# Patient Record
Sex: MALE | Race: WHITE
[De-identification: names, ages, dates, MRNs, and addresses within clinical notes are randomized per-mention and may not be internally consistent; named-entity substitution may affect disease eponyms.]

---

## 2018-03-29 ENCOUNTER — HOSPITAL ENCOUNTER (INPATIENT)
Dept: HOSPITAL 14 - H.ER | Age: 53
LOS: 4 days | Discharge: NURSING, REHAB OR LONG-TERM CARE | DRG: 202 | End: 2018-04-02
Attending: INTERNAL MEDICINE | Admitting: INTERNAL MEDICINE
Payer: MEDICARE

## 2018-03-29 DIAGNOSIS — E78.00: ICD-10-CM

## 2018-03-29 DIAGNOSIS — E11.65: ICD-10-CM

## 2018-03-29 DIAGNOSIS — E11.42: ICD-10-CM

## 2018-03-29 DIAGNOSIS — N18.9: ICD-10-CM

## 2018-03-29 DIAGNOSIS — T17.998A: ICD-10-CM

## 2018-03-29 DIAGNOSIS — J06.9: ICD-10-CM

## 2018-03-29 DIAGNOSIS — N39.0: ICD-10-CM

## 2018-03-29 DIAGNOSIS — I25.10: ICD-10-CM

## 2018-03-29 DIAGNOSIS — E78.5: ICD-10-CM

## 2018-03-29 DIAGNOSIS — J45.21: Primary | ICD-10-CM

## 2018-03-29 DIAGNOSIS — Z94.0: ICD-10-CM

## 2018-03-29 DIAGNOSIS — I48.0: ICD-10-CM

## 2018-03-29 DIAGNOSIS — Z95.1: ICD-10-CM

## 2018-03-29 DIAGNOSIS — I50.9: ICD-10-CM

## 2018-03-29 DIAGNOSIS — F41.9: ICD-10-CM

## 2018-03-29 DIAGNOSIS — Z79.01: ICD-10-CM

## 2018-03-29 DIAGNOSIS — Z95.5: ICD-10-CM

## 2018-03-29 DIAGNOSIS — I13.0: ICD-10-CM

## 2018-03-29 DIAGNOSIS — E11.22: ICD-10-CM

## 2018-03-29 LAB
ALBUMIN SERPL-MCNC: 3.8 G/DL (ref 3.5–5)
ALBUMIN/GLOB SERPL: 1.3 {RATIO} (ref 1–2.1)
ALT SERPL-CCNC: 31 U/L (ref 21–72)
ANISOCYTOSIS BLD QL SMEAR: SLIGHT
AST SERPL-CCNC: 29 U/L (ref 17–59)
BACTERIA #/AREA URNS HPF: (no result) /[HPF]
BASOPHILS # BLD AUTO: 0 K/UL (ref 0–0.2)
BASOPHILS NFR BLD: 0.7 % (ref 0–2)
BASOPHILS NFR BLD: 1 % (ref 0–2)
BILIRUB UR-MCNC: NEGATIVE MG/DL
BUN SERPL-MCNC: 24 MG/DL (ref 9–20)
CALCIUM SERPL-MCNC: 10.4 MG/DL (ref 8.4–10.2)
COLOR UR: YELLOW
EOSINOPHIL # BLD AUTO: 0.1 K/UL (ref 0–0.7)
EOSINOPHIL NFR BLD: 0.8 % (ref 0–4)
EOSINOPHIL NFR BLD: 2 % (ref 0–7)
ERYTHROCYTE [DISTWIDTH] IN BLOOD BY AUTOMATED COUNT: 16.2 % (ref 11.5–14.5)
GFR NON-AFRICAN AMERICAN: > 60
GLUCOSE UR STRIP-MCNC: >=500 MG/DL
GRAN CASTS #/AREA URNS LPF: 1 /LPF (ref 0–1)
HGB BLD-MCNC: 14.5 G/DL (ref 12–18)
LEUKOCYTE ESTERASE UR-ACNC: (no result) LEU/UL
LYMPHOCYTE: 3 % (ref 20–50)
LYMPHOCYTES # BLD AUTO: 0.1 K/UL (ref 1–4.3)
LYMPHOCYTES NFR BLD AUTO: 2.2 % (ref 20–40)
MCH RBC QN AUTO: 28.2 PG (ref 27–31)
MCHC RBC AUTO-ENTMCNC: 32.6 G/DL (ref 33–37)
MCV RBC AUTO: 86.4 FL (ref 80–94)
MONOCYTE: 8 % (ref 0–10)
MONOCYTES # BLD: 0.6 K/UL (ref 0–0.8)
MONOCYTES NFR BLD: 8.6 % (ref 0–10)
NEUTROPHILS # BLD: 5.7 K/UL (ref 1.8–7)
NEUTROPHILS NFR BLD AUTO: 86 % (ref 42–75)
NEUTROPHILS NFR BLD AUTO: 87.7 % (ref 50–75)
NRBC BLD AUTO-RTO: 0.1 % (ref 0–0)
PH UR STRIP: 6 [PH] (ref 5–8)
PLATELET # BLD EST: NORMAL 10*3/UL
PLATELET # BLD: 159 K/UL (ref 130–400)
PMV BLD AUTO: 8.8 FL (ref 7.2–11.7)
PROT UR STRIP-MCNC: 30 MG/DL
RBC # BLD AUTO: 5.14 MIL/UL (ref 4.4–5.9)
RBC # UR STRIP: NEGATIVE /UL
SP GR UR STRIP: 1.02 (ref 1–1.03)
SQUAMOUS EPITHIAL: 2 /HPF (ref 0–5)
TOTAL CELLS COUNTED BLD: 100
TROPONIN I SERPL-MCNC: 0.03 NG/ML (ref 0–0.12)
URINE AMORPHOUS SEDIMENT: (no result) /UL
URINE CLARITY: (no result)
UROBILINOGEN UR-MCNC: (no result) MG/DL (ref 0.2–1)
WBC # BLD AUTO: 6.5 K/UL (ref 4.8–10.8)

## 2018-03-29 NOTE — RAD
HISTORY:



COMPARISON:

05/16/2016.



TECHNIQUE:

Chest PA and lateral



FINDINGS:



LINES AND TUBES:

None. 



LUNG AND PLEURA:

There is mild pulmonary venous congestion. No focal consolidation. 

There is left pleural thickening.  No right pleural effusion. 



HEART AND MEDIASTINUM:

There is mild cardiomegaly. Status post CABG. The hilar and 

mediastinal contours are within normal limits.



SKELETAL STRUCTURES:

There are old fracture deformities in the left lower lateral ribs. 



VISUALIZED UPPER ABDOMEN:

Normal.



OTHER FINDINGS:

None.



IMPRESSION:

No active pulmonary disease.

## 2018-03-29 NOTE — ED PDOC
HPI: General Adult


Time Seen by Provider: 03/29/18 12:41


Chief Complaint (Nursing): ENT Problem


Chief Complaint (Provider): Cough


History Per: Patient


History/Exam Limitations: no limitations


Onset/Duration Of Symptoms: Days (x7)


Current Symptoms Are (Timing): Still Present


Additional Complaint(s): 


53 year old male presents to the emergency department complaining of a cough 

for 7 days. Patient saw his primary, Dr. Sanchez, on Monday and was prescribed 

Augmentin and promethazine cough medicine but he states he doesn't feel any 

better. He denies any fever or chills. 





PMD: Dr. Sanchez 





Past Medical History


Reviewed: Historical Data, Nursing Documentation, Vital Signs


Vital Signs: 


 Last Vital Signs











Temp  98.0 F   03/29/18 12:35


 


Pulse  79   03/29/18 12:35


 


Resp  16   03/29/18 12:35


 


BP  162/75 H  03/29/18 12:35


 


Pulse Ox  92 L  03/29/18 14:49














- Medical History


PMH: Anxiety, Asthma, Atrial Fibrillation, CAD, CHF, Diabetes, Fractures, HTN, 

Hypercholesterolemia, Hyperlipidemia, Chronic Kidney Disease





- Surgical History


Surgical History: CABG, Carotid Endarterectomy, Cholecystectomy, Coronary Stent


Other surgeries: Kidney transplant





- Family History


Family History: States: No Known Family Hx





- Living Arrangements


Living Arrangements: With Family





- Social History


Current smoker - smoking cessation education provided: No


Alcohol: None


Drugs: Denies





- Home Medications


Home Medications: 


 Ambulatory Orders











 Medication  Instructions  Recorded


 


Insulin Aspart/Insulin Aspar 16 - 18 unit SC DIN 01/13/16





[Novolog Mix 70/30 (70/30  





units/ml)]  


 


Insulin Aspart/Insulin Aspar 25 unit SC BRK 01/13/16





[Novolog Mix 70/30 (70/30  





units/ml)]  


 


Pantoprazole Sodium [Protonix] 40 mg PO ACL 01/13/16


 


Pregabalin [Lyrica] 100 mg PO HS 05/16/16


 


traMADol [Ultram] 50 mg PO DAILY PRN 05/16/16


 


Albuterol 0.083% [Albuterol 0.083% 3 ml IH Q6 PRN 03/29/18





Inhal Sol (2.5 mg/3 ml) UD]  


 


Alprazolam [Xanax] 0.5 mg PO HS PRN 03/29/18


 


Amoxicillin/Clavulanate [Augmentin 1 tab PO BID 03/29/18





875 MG-125 MG Tab]  


 


Aspirin [Ecotrin] 81 mg PO DAILY 03/29/18


 


Atorvastatin [Lipitor] 20 mg PO HS 03/29/18


 


Cholecalciferol (Vitamin D3) 2,000 unit PO DAILY 03/29/18





[Vitamin D3]  


 


Diltiazem HCl [Diltiazem 24Hr ER] 240 mg PO DAILY 03/29/18


 


Metoprolol Tartrate [Lopressor] 100 mg PO Q12 03/29/18


 


Multivitamin [Multi-Vitamin Daily] 1 tab PO DAILY 03/29/18


 


Mycophenolate Sodium [Mycophenolic 540 mg PO Q12 03/29/18





Acid]  


 


Promethazine [Phenergan Syrup] 10 ml PO Q6 PRN 03/29/18


 


Sulfamethoxazole/Trimethoprim 1 tab PO MWF 03/29/18





[Bactrim DS Tab]  


 


Tacrolimus [Astagraf Xl] 2 mg PO DAILY@0700 03/29/18


 


Warfarin [Coumadin] 5 mg PO SUMOTUTHSA 03/29/18


 


Warfarin [Coumadin] 7.5 mg PO WE 03/29/18


 


predniSONE [predniSONE Tab] 5 mg PO DAILY 03/29/18














- Allergies


Allergies/Adverse Reactions: 


 Allergies











Allergy/AdvReac Type Severity Reaction Status Date / Time


 


No Known Allergies Allergy   Verified 03/29/18 12:35














Review of Systems


ROS Statement: Except As Marked, All Systems Reviewed And Found Negative


Constitutional: Negative for: Fever, Chills


Respiratory: Positive for: Cough, Shortness of Breath, Wheezing.  Negative for: 

Hemoptysis, Sputum


Gastrointestinal: Negative for: Nausea, Vomiting


Neurological: Negative for: Headache, Dizziness





Physical Exam





- Reviewed


Nursing Documentation Reviewed: Yes


Vital Signs Reviewed: Yes





- Physical Exam


Appears: Positive for: Well, Non-toxic, No Acute Distress


Head Exam: Positive for: ATRAUMATIC, NORMAL INSPECTION, NORMOCEPHALIC


Skin: Positive for: Normal Color.  Negative for: Rash


Eye Exam: Positive for: Other (chronic vision loss left eye)


Cardiovascular/Chest: Positive for: Regular Rate, Rhythm


Respiratory: Positive for: Rhonchi, Wheezing (bilateral inspiratory and 

expiratory wheezing).  Negative for: Accessory Muscle Use, Respiratory Distress


Gastrointestinal/Abdominal: Positive for: Soft.  Negative for: Tenderness


Back: Negative for: L CVA Tenderness, R CVA Tenderness


Extremity: Positive for: Normal ROM


Neurologic/Psych: Positive for: Alert, Oriented





- Laboratory Results


Result Diagrams: 


 03/29/18 14:15





 03/29/18 14:15





- ECG


Interpretation Of ECG: 


Normal sinus rhythm 78 bpm, left axis deviation, left ventricular hypertrophy, 

reviewed by PA and ED attending


O2 Sat by Pulse Oximetry: 92 (RA)


Pulse Ox Interpretation: Abnormal





- Other Rad


  ** CXR


X-Ray: Interpreted by Me, Viewed By Me


X-Ray Interpretation: no acute finding





Nebulizer Treatments/Peak Flow





- Duonebs


Number of Bronchodilator Doses given?: 1 (duoneb)





- Pre/Post Peak Flow


Pre Treatment Peak Flow: 110


Post treatment Peak Flow: 190





- Steroid Treatment


Steroid: IV (125 solumedrol)





- Clinical Response


Clinical Response: Unchanged





Medical Decision Making


Medical Decision Making: 


Impression: 53 year old with URI symptoms





Time: 12:48





Initial Plan:


--Chest X-Ray


--Duoneb 3 ml INH


--Peak Flow pre/post treatment


--Flu swab 


--Urinalysis 


--Urine culture


--EKG


--CBC


--CMP


--Troponin I


--PO Robitussin with codeine 10 ml





14:45  Case discussed with patient's primary doctor, Dr. Sanchez, who recommends 

admission.  Patient has been taking oral Augmentin and cough meds for 4 days 

and reports no improvement to URI symptoms.  Patient's oxygen saturation is 

between 90 and 92% in the ED despite administration of IV steroids and DuoNeb 

treatments. Leukocytes also noted in the urine consistent with UTI.  Patient 

agrees with admission.  On gram IV Rocephin ordered along with 500 mg IV 

Zithromax.





--------------------------------------------------------------------------------

-----------------


Scribe Attestation:   


Documented by Breanna Weaver, acting as a scribe for Liz Oh PA-C





Provider Scribe Attestation:


All medical record entries made by the Scribe were at my direction and 

personally dictated by me. I have reviewed the chart and agree that the record 

accurately reflects my personal performance of the history, physical exam, 

medical decision making, and the department course for this patient. I have 

also personally directed, reviewed, and agree with the discharge instructions 

and disposition.





Disposition





- Clinical Impression


Clinical Impression: 


 Dyspnea and respiratory abnormalities, Urinary tract infection








- Patient ED Disposition


Is Patient to be Admitted: Yes





- Disposition


Disposition Time: 15:46


Condition: FAIR


Forms:  [facility] (English)





- Pt Status Changed To:


Hospital Disposition Of: Inpatient





- Admit Certification


Admit to Inpatient:: After my assessment, the patient will require 

hospitalization for at least two midnights.  This is because of the severity of 

symptoms shown, intensity of services needed, and/or the medical risk in this 

patient being treated as an outpatient.





- POA


Present On Arrival: None





Results





- Lab Results


Lab Results: 

















  03/29/18 03/29/18 03/29/18





  14:15 14:15 13:54


 


WBC   6.5 


 


RBC   5.14 


 


Hgb   14.5 


 


Hct   44.4 


 


MCV   86.4  D 


 


MCH   28.2 


 


MCHC   32.6 L 


 


RDW   16.2 H 


 


Plt Count   159 


 


MPV   8.8 


 


Neut % (Auto)   87.7 H 


 


Lymph % (Auto)   2.2 L 


 


Mono % (Auto)   8.6 


 


Eos % (Auto)   0.8 


 


Baso % (Auto)   0.7 


 


Neut # (Auto)   5.7 


 


Lymph # (Auto)   0.1 L 


 


Mono # (Auto)   0.6 


 


Eos # (Auto)   0.1 


 


Baso # (Auto)   0.0 


 


Neutrophils % (Manual)   Pending 


 


Lymphocytes % (Manual)   Pending 


 


Monocytes % (Manual)   Pending 


 


Platelet Estimate   Pending 


 


Sodium  139  


 


Potassium  4.8  


 


Chloride  100  


 


Carbon Dioxide  28  


 


Anion Gap  16  


 


BUN  24 H  


 


Creatinine  1.1  


 


Est GFR ( Amer)  > 60  


 


Est GFR (Non-Af Amer)  > 60  


 


POC Glucose (mg/dL)   


 


Random Glucose  229 H  


 


Calcium  10.4 H  


 


Total Bilirubin  0.6  


 


AST  29  


 


ALT  31  


 


Alkaline Phosphatase  99  


 


Troponin I  0.0340  


 


Total Protein  6.6  


 


Albumin  3.8  


 


Globulin  2.8  


 


Albumin/Globulin Ratio  1.3  


 


Urine Color    Yellow


 


Urine Clarity    Slighty-cloudy


 


Urine pH    6.0


 


Ur Specific Gravity    1.022


 


Urine Protein    30


 


Urine Glucose (UA)    >=500


 


Urine Ketones    Negative


 


Urine Blood    Negative


 


Urine Nitrate    Negative


 


Urine Bilirubin    Negative


 


Urine Urobilinogen    0.2-1.0


 


Ur Leukocyte Esterase    Small


 


Urine RBC (Auto)    2


 


Urine Microscopic WBC    9 H


 


Ur Squamous Epith Cells    2


 


Amorphous Sediment    Few H


 


Urine Bacteria    Few H


 


Granular Casts (Auto)    1


 


Influenza Typ A,B (EIA)   














  03/29/18 03/29/18





  13:54 13:31


 


WBC  


 


RBC  


 


Hgb  


 


Hct  


 


MCV  


 


MCH  


 


MCHC  


 


RDW  


 


Plt Count  


 


MPV  


 


Neut % (Auto)  


 


Lymph % (Auto)  


 


Mono % (Auto)  


 


Eos % (Auto)  


 


Baso % (Auto)  


 


Neut # (Auto)  


 


Lymph # (Auto)  


 


Mono # (Auto)  


 


Eos # (Auto)  


 


Baso # (Auto)  


 


Neutrophils % (Manual)  


 


Lymphocytes % (Manual)  


 


Monocytes % (Manual)  


 


Platelet Estimate  


 


Sodium  


 


Potassium  


 


Chloride  


 


Carbon Dioxide  


 


Anion Gap  


 


BUN  


 


Creatinine  


 


Est GFR ( Amer)  


 


Est GFR (Non-Af Amer)  


 


POC Glucose (mg/dL)   234 H


 


Random Glucose  


 


Calcium  


 


Total Bilirubin  


 


AST  


 


ALT  


 


Alkaline Phosphatase  


 


Troponin I  


 


Total Protein  


 


Albumin  


 


Globulin  


 


Albumin/Globulin Ratio  


 


Urine Color  


 


Urine Clarity  


 


Urine pH  


 


Ur Specific Gravity  


 


Urine Protein  


 


Urine Glucose (UA)  


 


Urine Ketones  


 


Urine Blood  


 


Urine Nitrate  


 


Urine Bilirubin  


 


Urine Urobilinogen  


 


Ur Leukocyte Esterase  


 


Urine RBC (Auto)  


 


Urine Microscopic WBC  


 


Ur Squamous Epith Cells  


 


Amorphous Sediment  


 


Urine Bacteria  


 


Granular Casts (Auto)  


 


Influenza Typ A,B (EIA)  Negative for flu a/b

## 2018-03-29 NOTE — CARD
--------------- APPROVED REPORT --------------





EKG Measurement

Heart [id number]

VT [id number]

[id number]

[id number]

[id number]



<Conclusion>

Normal sinus rhythm

Possible Left atrial enlargement

Left axis deviation

Left ventricular hypertrophy with repolarization abnormality

Abnormal ECG

## 2018-03-30 LAB
BUN SERPL-MCNC: 25 MG/DL (ref 9–20)
CALCIUM SERPL-MCNC: 9.9 MG/DL (ref 8.4–10.2)
ERYTHROCYTE [DISTWIDTH] IN BLOOD BY AUTOMATED COUNT: 16.1 % (ref 11.5–14.5)
GFR NON-AFRICAN AMERICAN: > 60
HDLC SERPL-MCNC: 36 MG/DL (ref 30–70)
HGB BLD-MCNC: 14.3 G/DL (ref 12–18)
INR PPP: 1.8 (ref 0.9–1.2)
LDLC SERPL-MCNC: 114 MG/DL (ref 0–129)
MCH RBC QN AUTO: 28.3 PG (ref 27–31)
MCHC RBC AUTO-ENTMCNC: 32.5 G/DL (ref 33–37)
MCV RBC AUTO: 87.2 FL (ref 80–94)
PLATELET # BLD: 163 K/UL (ref 130–400)
PROTHROMBIN TIME: 19.7 SECONDS (ref 9.8–13.1)
RBC # BLD AUTO: 5.05 MIL/UL (ref 4.4–5.9)
WBC # BLD AUTO: 6.8 K/UL (ref 4.8–10.8)

## 2018-03-30 RX ADMIN — IPRATROPIUM BROMIDE AND ALBUTEROL SULFATE SCH ML: .5; 3 SOLUTION RESPIRATORY (INHALATION) at 14:26

## 2018-03-30 RX ADMIN — IPRATROPIUM BROMIDE AND ALBUTEROL SULFATE SCH ML: .5; 3 SOLUTION RESPIRATORY (INHALATION) at 19:19

## 2018-03-30 RX ADMIN — PROMETHAZINE HYDROCHLORIDE PRN MG: 6.25 SYRUP ORAL at 14:11

## 2018-03-30 RX ADMIN — PROMETHAZINE HYDROCHLORIDE PRN MG: 6.25 SYRUP ORAL at 20:58

## 2018-03-30 RX ADMIN — SULFAMETHOXAZOLE AND TRIMETHOPRIM SCH TAB: 800; 160 TABLET ORAL at 08:41

## 2018-03-30 RX ADMIN — DILTIAZEM HYDROCHLORIDE SCH MG: 240 CAPSULE, COATED, EXTENDED RELEASE ORAL at 08:42

## 2018-03-30 RX ADMIN — PANTOPRAZOLE SODIUM SCH MG: 40 TABLET, DELAYED RELEASE ORAL at 12:54

## 2018-03-30 RX ADMIN — Medication SCH TAB: at 08:52

## 2018-03-30 RX ADMIN — INSULIN LISPRO SCH UNITS: 100 INJECTION, SOLUTION INTRAVENOUS; SUBCUTANEOUS at 14:07

## 2018-03-30 RX ADMIN — INSULIN LISPRO SCH UNITS: 100 INJECTION, SOLUTION INTRAVENOUS; SUBCUTANEOUS at 17:27

## 2018-03-30 RX ADMIN — IPRATROPIUM BROMIDE AND ALBUTEROL SULFATE SCH ML: .5; 3 SOLUTION RESPIRATORY (INHALATION) at 02:41

## 2018-03-30 RX ADMIN — INSULIN LISPRO SCH UNITS: 100 INJECTION, SUSPENSION SUBCUTANEOUS at 08:46

## 2018-03-30 RX ADMIN — VITAMIN D, TAB 1000IU (100/BT) SCH INTLU: 25 TAB at 08:53

## 2018-03-30 RX ADMIN — PROMETHAZINE HYDROCHLORIDE PRN MG: 6.25 SYRUP ORAL at 02:38

## 2018-03-30 RX ADMIN — IPRATROPIUM BROMIDE AND ALBUTEROL SULFATE SCH ML: .5; 3 SOLUTION RESPIRATORY (INHALATION) at 07:57

## 2018-03-30 NOTE — PQF GENQUE
Dr. Sanchez,



3 queries: 

CHF and CKD and PAF: listed as hx.: Are any of these dxs. currently being 
treated?

-------Current Paroxysmal A-Fib 

-------If CHF is a current condition: please include the acuity and type if 
known

------  If CKD ruled in : Stage? 



versus: history and not current conditions





BUN:24->25

Creatinine: 1.1->1.0

Est GRF: >60/>60





ER: PE: Respiratory: Positive for: Rhonchi, Wheezing (bilateral inspiratory and 
expiratory wheezing). 

Negative for: Accessory Muscle Use, Respiratory Distress 

O2 Sat by Pulse Oximetry: 92 (RA)

 Duonebs  Number of Bronchodilator Doses given?: 1 (duoneb)  

- Pre/Post Peak Flow  Pre Treatment Peak Flow: 110  Post treatment Peak Flow: 
190  

- Steroid Treatment  Steroid: IV (125 solumedrol)  

Patient's oxygen saturation is between 90 and 92% in the ED despite 
administration of IV steroids and DuoNeb treatments

Clinical Impression: Dyspnea and respiratory abnormalities, Urinary tract 
infection  





H and P: H and P: PE: LUNG: Poor aeration bilaterally with audible wheezing and 
rales

EXTREMITIES: Show 1+ pitting pedal edema.

Chest 

x-ray mild bilateral pulmonary congestion, otherwise, unremarkable. 

IMPRESSION:   Shortness of breath, cough probably secondary to acute asthma 
exacerbation, upper respiratory tract infection, history of coronary artery 
bypass graft, history of diabetes mellitus with hyperglycemia (type 2 diabetes)
, history of renal transplantation, history of peripheral neuropathy, and 
hyperlipidemia. 







meds include: Coumadin daily, ASA, Diltiazem







***** This form is a permanent part of the medical record*****





          

Clarification of your documentation is requested to better reflect the severity 
of illness and intensity of treatment of your patient.  



Indicators present      



[]  Specify: []

         



[]  Specify: []         

 



[]  Specify: []         





[]  Specify: []         





Location in the medical record that reflects the above clinical findings:  []

 



Treatment Provided:  []

  

PHYSICIAN'S RESPONSE





Based on your medical judgment of the clinical indicators outlined above please 
clarify the following:



[x]  Practitioner response --paroxsmal a.fib--now in sinus rhythm--being 
treated with coumadin

chf-stable



[]  If unable to determine, please check the box, sign and date.  





Present On Admission (POA) Indicator:





[] Present at the time of admission 



[] Not present at the time of admission



[] Clinically Undetermined









In responding to this query, please exercise your independent professional 
judgment.  The fact that a question is asked does not imply that any particular 
answer is desired or expected.  Thank you for your clarification on this 
documentation.



If you have any questions please call.

* Thank you,

   Darcy Ortiz RN

   ext. #[phone] 
MTDD

## 2018-03-30 NOTE — PQF GENQUE
Dr. Sanchez,



Please clarify type of asthma exacerbation : if known: i.e.

     Mild intermittent 

     Mild persistent 

     Moderate persistent 

     Severe persistent 

      Other (please specify)_____________________ 

     Clinically unable to determine 

     Unknown 



Albuterol INH  stat  x 2 and Q6 hrs., Solumedrol IV  







 

***** This form is a permanent part of the medical record*****





          

Clarification of your documentation is requested to better reflect the severity 
of illness and intensity of treatment of your patient.  



Indicators present      



[]  Specify: []

         



[]  Specify: []         

 



[]  Specify: []         





[]  Specify: []         





Location in the medical record that reflects the above clinical findings:  []

 



Treatment Provided:  []

  

PHYSICIAN'S RESPONSE





Based on your medical judgment of the clinical indicators outlined above please 
clarify the following:



[x]  Practitioner response --acute exacerbation of mild intermittent asthma



[]  If unable to determine, please check the box, sign and date.  





Present On Admission (POA) Indicator:





[] Present at the time of admission 



[] Not present at the time of admission



[] Clinically Undetermined









In responding to this query, please exercise your independent professional 
judgment.  The fact that a question is asked does not imply that any particular 
answer is desired or expected.  Thank you for your clarification on this 
documentation.



If you have any questions please call.

* Thank you,

   Darcy Ortiz RN

   ext. #[phone] 
MTDD

## 2018-03-30 NOTE — HP
HISTORY OF PRESENT ILLNESS:  Mr. Mena is a 53-year-old male who was

admitted via the Emergency Room because of progressively worsening

shortness of breath, exercise intolerance, and cough productive of thick

tenacious mucus for the past 1 week prior to presentation.  He was seen in

the office about 2 weeks prior to this presentation, treated with oral

antibiotics and symptoms improved but then he got sick again after he was

exposed to his nephew at home who had symptoms of upper respiratory tract

infection and was recently diagnosed to have a strep throat.  He showed up

in the office and was placed on Augmentin, but symptoms, however, worsened.

He denies fever or chills, but admits to cough, chest tightness, exercise

intolerance, and sore throat.



PAST MEDICAL HISTORY:  Coronary artery bypass graft, recent renal

transplantation, paroxysmal atrial fibrillation, asthma, coronary artery

disease, congestive heart failure, diabetes mellitus, hypertension,

hyperlipidemia, and anxiety disorder with peripheral neuropathy.



FAMILY HISTORY:  Noncontributory.



SOCIAL HISTORY:  He does not smoke or drink and lives at home with wife.



REVIEW OF SYSTEMS:  Remarkable for unsteadiness of gait, exercise

intolerance, and pain in the lower extremities.



PHYSICAL EXAMINATION:

GENERAL:  The patient is alert and oriented, still uncomfortable and

appears chronically ill.

VITAL SIGNS:  Blood pressure of 162/75, pulse of 79, respiratory rate of

16-20 per minute.  He is febrile with temperature of 98 degrees Fahrenheit,

O2 sat 92% on room air.

SKIN:  Shows fair turgor.

HEENT:  Pupils are equal and reactive to light and accommodation.  JVP

flat.  Mouth shows fair hygiene with mucous engorgement of pharynx.

LUNG:  Poor aeration bilaterally with audible wheezing and rales.

HEART:  Regular.  No murmurs or gallops appreciated.  There is a scar of

coronary artery bypass graft over the anterior chest wall area.

ABDOMEN:  Soft and nontender, no organomegaly.

EXTREMITIES:  Show 1+ pitting pedal edema.

CENTRAL NERVOUS SYSTEM:  Exam grossly intact except for unsteadiness of

gait.  The patient is alert and oriented x3.



LABORATORY DATA:  WBC 6.8, hemoglobin 14.3, and platelet count of 163,000. 

Sodium of 135, potassium of 5.1, BUN of 25, creatinine of 1.0, and serum

glucose of 361.  ProBNP of 1310.  Cholesterol of 165 and .  Chest

x-ray mild bilateral pulmonary congestion, otherwise, unremarkable. 

Hardware of coronary artery bypass graft noted.  EKG is remarkable for

normal sinus rhythm, possible left atrial enlargement, left ventricular

_____, and left ventricular hypertrophy by voltage.











IMPRESSION:  Shortness of breath, cough probably secondary to acute asthma

exacerbation, upper respiratory tract infection, history of coronary artery

bypass graft, history of diabetes mellitus with hyperglycemia (type 2

diabetes), history of renal transplantation, history of peripheral

neuropathy, and hyperlipidemia.



PLAN:  The plan is intravenous steroids as well as bronchodilators,

intravenous antibiotics, and oxygen.  We would obtain sputum for Gram stain

and cultures.  Septic workup already done in the Emergency Room.  Further

therapy will depend on findings.  We will discharge home once clinically

stable.





__________________________________________

Bismark Sanchez MD





DD:  03/30/2018 10:29:00

DT:  03/30/2018 10:31:49

Job # [id number]

## 2018-03-30 NOTE — PQF GENQUE
Dr. Sanchez,



2 queries: 

 1.Respiratory Failure ruled in or ruled out?: this dx. is listed in the 
Admission order and then the diagnosis is dropped 

2. If ruled in Acuity and Type?



OR; Unable to determine





ER: PE: Respiratory: Positive for: Rhonchi, Wheezing (bilateral inspiratory and 
expiratory wheezing). 

Negative for: Accessory Muscle Use, Respiratory Distress 

O2 Sat by Pulse Oximetry: 92 (RA)

 Duonebs  

Number of Bronchodilator Doses given?: 1 (duoneb)  

- Pre/Post Peak Flow  Pre Treatment Peak Flow: 110  Post treatment Peak Flow: 
190  

- Steroid Treatment  Steroid: IV (125 solumedrol)  

Patient's oxygen saturation is between 90 and 92% in the ED despite 
administration of IV 

steroids and DuoNeb treatments

Clinical Impression:   Dyspnea and respiratory abnormalities, Urinary tract 
infection  



H and P: PE: LUNG: Poor aeration bilaterally with audible wheezing and rales. 

 Impression: SOB, Cough probably secondary to acute Asthma Exacerbation, URI







***** This form is a permanent part of the medical record*****





          

Clarification of your documentation is requested to better reflect the severity 
of illness and intensity of treatment of your patient.  



Indicators present      



[]  Specify: []

         



[]  Specify: []         

 



[]  Specify: []         





[]  Specify: []         





Location in the medical record that reflects the above clinical findings:  []

 



Treatment Provided:  []

  

PHYSICIAN'S RESPONSE





Based on your medical judgment of the clinical indicators outlined above please 
clarify the following:



[x]  Practitioner response --respiratory failure ruled out



[]  If unable to determine, please check the box, sign and date.  





Present On Admission (POA) Indicator:





[] Present at the time of admission 



[] Not present at the time of admission



[] Clinically Undetermined









In responding to this query, please exercise your independent professional 
judgment.  The fact that a question is asked does not imply that any particular 
answer is desired or expected.  Thank you for your clarification on this 
documentation.



If you have any questions please call.

* Thank you,

   Darcy Ortiz RN

   ext. #[phone] 
MTDD

## 2018-03-31 LAB
BUN SERPL-MCNC: 34 MG/DL (ref 9–20)
CALCIUM SERPL-MCNC: 10.1 MG/DL (ref 8.4–10.2)
GFR NON-AFRICAN AMERICAN: > 60
INR PPP: 2.6 (ref 0.9–1.2)
PROTHROMBIN TIME: 28.9 SECONDS (ref 9.8–13.1)

## 2018-03-31 RX ADMIN — IPRATROPIUM BROMIDE AND ALBUTEROL SULFATE SCH ML: .5; 3 SOLUTION RESPIRATORY (INHALATION) at 07:55

## 2018-03-31 RX ADMIN — INSULIN LISPRO SCH UNITS: 100 INJECTION, SUSPENSION SUBCUTANEOUS at 08:00

## 2018-03-31 RX ADMIN — VITAMIN D, TAB 1000IU (100/BT) SCH INTLU: 25 TAB at 09:24

## 2018-03-31 RX ADMIN — PANTOPRAZOLE SODIUM SCH MG: 40 TABLET, DELAYED RELEASE ORAL at 12:00

## 2018-03-31 RX ADMIN — ACETYLCYSTEINE SCH ML: 200 INHALANT RESPIRATORY (INHALATION) at 20:02

## 2018-03-31 RX ADMIN — Medication SCH TAB: at 09:21

## 2018-03-31 RX ADMIN — INSULIN LISPRO SCH UNITS: 100 INJECTION, SOLUTION INTRAVENOUS; SUBCUTANEOUS at 08:00

## 2018-03-31 RX ADMIN — IPRATROPIUM BROMIDE AND ALBUTEROL SULFATE SCH ML: .5; 3 SOLUTION RESPIRATORY (INHALATION) at 20:02

## 2018-03-31 RX ADMIN — INSULIN LISPRO SCH UNITS: 100 INJECTION, SUSPENSION SUBCUTANEOUS at 18:00

## 2018-03-31 RX ADMIN — PROMETHAZINE HYDROCHLORIDE PRN MG: 6.25 SYRUP ORAL at 03:47

## 2018-03-31 RX ADMIN — INSULIN LISPRO SCH UNITS: 100 INJECTION, SOLUTION INTRAVENOUS; SUBCUTANEOUS at 17:00

## 2018-03-31 RX ADMIN — PROMETHAZINE HYDROCHLORIDE AND CODEINE PHOSPHATE PRN ML: 6.25; 1 SOLUTION ORAL at 18:12

## 2018-03-31 RX ADMIN — INSULIN LISPRO SCH UNITS: 100 INJECTION, SOLUTION INTRAVENOUS; SUBCUTANEOUS at 11:16

## 2018-03-31 RX ADMIN — METHYLPREDNISOLONE SODIUM SUCCINATE SCH MG: 40 INJECTION, POWDER, FOR SOLUTION INTRAMUSCULAR; INTRAVENOUS at 21:12

## 2018-03-31 RX ADMIN — DILTIAZEM HYDROCHLORIDE SCH MG: 240 CAPSULE, COATED, EXTENDED RELEASE ORAL at 09:23

## 2018-03-31 RX ADMIN — INSULIN LISPRO SCH: 100 INJECTION, SOLUTION INTRAVENOUS; SUBCUTANEOUS at 22:15

## 2018-03-31 RX ADMIN — IPRATROPIUM BROMIDE AND ALBUTEROL SULFATE SCH ML: .5; 3 SOLUTION RESPIRATORY (INHALATION) at 14:15

## 2018-03-31 RX ADMIN — IPRATROPIUM BROMIDE AND ALBUTEROL SULFATE SCH ML: .5; 3 SOLUTION RESPIRATORY (INHALATION) at 01:00

## 2018-03-31 RX ADMIN — METHYLPREDNISOLONE SODIUM SUCCINATE SCH: 40 INJECTION, POWDER, FOR SOLUTION INTRAMUSCULAR; INTRAVENOUS at 12:00

## 2018-03-31 NOTE — CP.PCM.PN
Subjective





- Date & Time of Evaluation


Date of Evaluation: 03/31/18


Time of Evaluation: 11:47





- Subjective


Subjective: 





MORE SHORT OF BREATH WITH COUGHING SPELLS TODAY








Objective





- Vital Signs/Intake and Output


Vital Signs (last 24 hours): 


 











Temp Pulse Resp BP Pulse Ox


 


 97.4 F L  65   20   135/71   93 L


 


 03/31/18 08:35  03/31/18 09:23  03/31/18 08:35  03/31/18 09:23  03/31/18 08:35











- Medications


Medications: 


 Current Medications





Acetaminophen (Tylenol 325mg Tab)  650 mg PO Q6 PRN


   PRN Reason:  temp 101


Acetylcysteine (Acetylcysteine 20%)  2 ml INH RBID [facility]


Albuterol/Ipratropium (Duoneb 3 Mg/0.5 Mg (3 Ml) Ud)  3 ml INH RQ6 [facility]


   Last Admin: 03/31/18 07:55 Dose:  3 ml


Alprazolam (Xanax)  0.5 mg PO HS PRN


   PRN Reason: Anxiety


Aspirin (Ecotrin)  81 mg PO DAILY [facility]


   Last Admin: 03/31/18 09:25 Dose:  81 mg


Atorvastatin Calcium (Lipitor)  20 mg PO HS [facility]


   Last Admin: 03/30/18 21:01 Dose:  20 mg


Cholecalciferol (Vitamin D)  2,000 intlu PO DAILY [facility]


   Last Admin: 03/31/18 09:24 Dose:  2,000 intlu


Diltiazem HCl (Cardizem Cd)  240 mg PO DAILY [facility]


   Last Admin: 03/31/18 09:23 Dose:  240 mg


Home Med (Mycophenolate Sodium [Mycophenolic Acid])  540 mg PO Q12 [facility]


   Last Admin: 03/31/18 09:00 Dose:  540 mg


Home Med (Tacrolimus [Astagraf Xl])  3 mg PO DAILY@0700 [facility]


   Last Admin: 03/31/18 08:00 Dose:  3 mg


Azithromycin 500 mg/ Sodium (Chloride)  250 mls @ 250 mls/hr IVPB DAILY [facility]


   PRN Reason: Protocol


   Last Admin: 03/31/18 09:00 Dose:  250 mls/hr


Ceftriaxone Sodium 1 gm/ (Sodium Chloride)  100 mls @ 100 mls/hr IVPB DAILY [facility]


   PRN Reason: Protocol


   Last Admin: 03/31/18 09:00 Dose:  100 mls/hr


Insulin Human Lispro (Humalog)  0 units SC ACHS [facility]


   PRN Reason: Protocol


   Last Admin: 03/31/18 11:16 Dose:  10 units


Insulin Human Regular (Humulin R)  15 units SC ONCE ONE


   Stop: 03/31/18 12:01


Insulin Lispro Protam/Lispro Human (Humalog Mix 75/25)  30 units SC BRK [facility]


Insulin Lispro Protam/Lispro Human (Humalog Mix 75/25)  20 units SC DIN [facility]


Methylprednisolone (Solu-Medrol)  40 mg IVP Q8H [facility]


Metoprolol Tartrate (Lopressor)  100 mg PO Q12 [facility]


   Last Admin: 03/30/18 20:58 Dose:  100 mg


Multivitamins/Minerals (Therapeutic-M Tab)  1 tab PO DAILY [facility]


   Last Admin: 03/31/18 09:21 Dose:  1 tab


Pantoprazole Sodium (Protonix Ec Tab)  40 mg PO ACL [facility]


   Last Admin: 03/30/18 12:54 Dose:  40 mg


Prednisone (Prednisone Tab)  5 mg PO DAILY [facility]


   Last Admin: 03/31/18 09:24 Dose:  5 mg


Pregabalin (Lyrica)  100 mg PO HS [facility]


   Last Admin: 03/30/18 21:02 Dose:  100 mg


Promethazine HCl/Codeine (Phenergan/Codeine Oral Syrup)  10 ml PO Q6 PRN


   PRN Reason: Cough


Tramadol HCl (Ultram)  50 mg PO DAILY PRN


   PRN Reason: Pain, severe (8-10)


Trimethoprim/Sulfamethoxazole (Bactrim Ds Tab)  1 tab PO MWF [facility]


   PRN Reason: Protocol


   Last Admin: 03/30/18 08:41 Dose:  1 tab











- Labs


Labs: 


 





 03/30/18 05:00 





 03/31/18 06:15 





 











PT  28.9 Seconds (9.8-13.1)  H D 03/31/18  06:15    


 


INR  2.6  (0.9-1.2)  H D 03/31/18  06:15    














- Constitutional


Appears: Chronically Ill





- Head Exam


Head Exam: ATRAUMATIC, NORMAL INSPECTION, NORMOCEPHALIC





- Eye Exam


Eye Exam: EOMI, Normal appearance, PERRL


Pupil Exam: NORMAL ACCOMODATION, PERRL





- ENT Exam


ENT Exam: Mucous Membranes Moist, Normal Exam





- Neck Exam


Neck Exam: Full ROM, Normal Inspection.  absent: Lymphadenopathy





- Respiratory Exam


Respiratory Exam: Decreased Breath Sounds, Prolonged Expiratory Phase, Rales, 

Wheezes, NORMAL BREATHING PATTERN





- Cardiovascular Exam


Cardiovascular Exam: REGULAR RHYTHM, +S1, +S2.  absent: Murmur





- GI/Abdominal Exam


GI & Abdominal Exam: Soft, Normal Bowel Sounds.  absent: Tenderness





- Rectal Exam


Rectal Exam: NORMAL INSPECTION





- Extremities Exam


Extremities Exam: Full ROM, Normal Capillary Refill, Normal Inspection.  absent

: Joint Swelling, Pedal Edema





- Back Exam


Back Exam: NORMAL INSPECTION





- Neurological Exam


Neurological Exam: Alert, Awake, CN II-XII Intact, Normal Gait, Oriented x3





- Psychiatric Exam


Psychiatric exam: Normal Affect, Normal Mood





- Skin


Skin Exam: Dry, Intact, Normal Color, Warm





Assessment and Plan





- Assessment and Plan (Free Text)


Assessment: 





ACUTE EXACERBATION OF ASTHMA


URI


UNCONTROLLED TYPE 2 DM


PEDAL EDEMA


Plan: 





CONTINUE RX AS ORDERED

## 2018-03-31 NOTE — RAD
HISTORY:

asthma  



COMPARISON:

Chest radiograph dated 03/29/2018.



TECHNIQUE:

Chest PA and lateral



FINDINGS:



LUNGS:

No active pulmonary disease.



PLEURA:

No significant pleural effusion identified. No pneumothorax apparent.



CARDIOVASCULAR:

Prior sternotomy with sternal wires and surgical clips 

redemonstrated.  Atherosclerotic aortic calcifications.  

Cardiomediastinal silhouette unchanged.



OSSEOUS STRUCTURES:

Unchanged.



VISUALIZED UPPER ABDOMEN:

Normal.



OTHER FINDINGS:

None.



IMPRESSION:

No active disease.

## 2018-04-01 LAB
INR PPP: 4.9 (ref 0.9–1.2)
PROTHROMBIN TIME: 56.7 SECONDS (ref 9.8–13.1)

## 2018-04-01 RX ADMIN — Medication SCH TAB: at 08:49

## 2018-04-01 RX ADMIN — PROMETHAZINE HYDROCHLORIDE AND CODEINE PHOSPHATE SCH ML: 6.25; 1 SOLUTION ORAL at 17:27

## 2018-04-01 RX ADMIN — INSULIN LISPRO SCH: 100 INJECTION, SOLUTION INTRAVENOUS; SUBCUTANEOUS at 21:38

## 2018-04-01 RX ADMIN — DILTIAZEM HYDROCHLORIDE SCH MG: 240 CAPSULE, COATED, EXTENDED RELEASE ORAL at 08:49

## 2018-04-01 RX ADMIN — INSULIN LISPRO SCH: 100 INJECTION, SOLUTION INTRAVENOUS; SUBCUTANEOUS at 21:37

## 2018-04-01 RX ADMIN — PROMETHAZINE HYDROCHLORIDE AND CODEINE PHOSPHATE SCH ML: 6.25; 1 SOLUTION ORAL at 13:34

## 2018-04-01 RX ADMIN — INSULIN LISPRO SCH UNITS: 100 INJECTION, SOLUTION INTRAVENOUS; SUBCUTANEOUS at 11:14

## 2018-04-01 RX ADMIN — INSULIN LISPRO SCH UNITS: 100 INJECTION, SUSPENSION SUBCUTANEOUS at 17:29

## 2018-04-01 RX ADMIN — ACETYLCYSTEINE SCH ML: 200 INHALANT RESPIRATORY (INHALATION) at 07:28

## 2018-04-01 RX ADMIN — METHYLPREDNISOLONE SODIUM SUCCINATE SCH MG: 40 INJECTION, POWDER, FOR SOLUTION INTRAMUSCULAR; INTRAVENOUS at 11:13

## 2018-04-01 RX ADMIN — METHYLPREDNISOLONE SODIUM SUCCINATE SCH MG: 40 INJECTION, POWDER, FOR SOLUTION INTRAMUSCULAR; INTRAVENOUS at 03:43

## 2018-04-01 RX ADMIN — IPRATROPIUM BROMIDE AND ALBUTEROL SULFATE SCH ML: .5; 3 SOLUTION RESPIRATORY (INHALATION) at 07:28

## 2018-04-01 RX ADMIN — PANTOPRAZOLE SODIUM SCH MG: 40 TABLET, DELAYED RELEASE ORAL at 11:45

## 2018-04-01 RX ADMIN — INSULIN LISPRO SCH UNITS: 100 INJECTION, SUSPENSION SUBCUTANEOUS at 08:43

## 2018-04-01 RX ADMIN — PROMETHAZINE HYDROCHLORIDE AND CODEINE PHOSPHATE PRN ML: 6.25; 1 SOLUTION ORAL at 04:02

## 2018-04-01 RX ADMIN — INSULIN LISPRO SCH UNITS: 100 INJECTION, SOLUTION INTRAVENOUS; SUBCUTANEOUS at 08:44

## 2018-04-01 RX ADMIN — IPRATROPIUM BROMIDE AND ALBUTEROL SULFATE SCH ML: .5; 3 SOLUTION RESPIRATORY (INHALATION) at 11:14

## 2018-04-01 RX ADMIN — VITAMIN D, TAB 1000IU (100/BT) SCH INTLU: 25 TAB at 08:50

## 2018-04-01 RX ADMIN — METHYLPREDNISOLONE SODIUM SUCCINATE SCH MG: 40 INJECTION, POWDER, FOR SOLUTION INTRAMUSCULAR; INTRAVENOUS at 21:36

## 2018-04-01 RX ADMIN — IPRATROPIUM BROMIDE AND ALBUTEROL SULFATE SCH ML: .5; 3 SOLUTION RESPIRATORY (INHALATION) at 19:26

## 2018-04-01 RX ADMIN — ACETYLCYSTEINE SCH ML: 200 INHALANT RESPIRATORY (INHALATION) at 19:26

## 2018-04-01 RX ADMIN — IPRATROPIUM BROMIDE AND ALBUTEROL SULFATE SCH ML: .5; 3 SOLUTION RESPIRATORY (INHALATION) at 01:00

## 2018-04-01 RX ADMIN — PROMETHAZINE HYDROCHLORIDE AND CODEINE PHOSPHATE SCH ML: 6.25; 1 SOLUTION ORAL at 21:35

## 2018-04-01 RX ADMIN — INSULIN LISPRO SCH UNITS: 100 INJECTION, SOLUTION INTRAVENOUS; SUBCUTANEOUS at 17:29

## 2018-04-01 RX ADMIN — IPRATROPIUM BROMIDE AND ALBUTEROL SULFATE SCH ML: .5; 3 SOLUTION RESPIRATORY (INHALATION) at 16:03

## 2018-04-01 NOTE — CP.PCM.PN
Subjective





- Date & Time of Evaluation


Date of Evaluation: 04/01/18


Time of Evaluation: 10:17





- Subjective


Subjective: 





CONTINUES TO COUGH AND HAVE SHORTNESS OF BREATH AT REST AND ON MILD EXERTION


DENIES CHEST PAINS


SPUTUM IS THICK 





Objective





- Vital Signs/Intake and Output


Vital Signs (last 24 hours): 


 











Temp Pulse Resp BP Pulse Ox


 


 97.8 F   97 H  20   126/78   94 L


 


 04/01/18 08:31  04/01/18 08:50  04/01/18 08:31  04/01/18 08:50  04/01/18 08:31











- Medications


Medications: 


 Current Medications





Acetaminophen (Tylenol 325mg Tab)  650 mg PO Q6 PRN


   PRN Reason:  temp 101


Acetylcysteine (Acetylcysteine 20%)  2 ml INH RBID [facility]


   Last Admin: 04/01/18 07:28 Dose:  2 ml


Albuterol/Ipratropium (Duoneb 3 Mg/0.5 Mg (3 Ml) Ud)  3 ml INH Q4 [facility]


Alprazolam (Xanax)  0.5 mg PO HS PRN


   PRN Reason: Anxiety


Aspirin (Ecotrin)  81 mg PO DAILY [facility]


   Last Admin: 04/01/18 08:49 Dose:  81 mg


Atorvastatin Calcium (Lipitor)  20 mg PO HS [facility]


   Last Admin: 03/31/18 22:13 Dose:  20 mg


Cholecalciferol (Vitamin D)  2,000 intlu PO DAILY [facility]


   Last Admin: 04/01/18 08:50 Dose:  2,000 intlu


Diltiazem HCl (Cardizem Cd)  240 mg PO DAILY [facility]


   Last Admin: 04/01/18 08:49 Dose:  240 mg


Home Med (Mycophenolate Sodium [Mycophenolic Acid])  540 mg PO Q12 [facility]


   Last Admin: 04/01/18 08:46 Dose:  540 mg


Home Med (Tacrolimus [Astagraf Xl])  3 mg PO DAILY@0700 [facility]


   Last Admin: 04/01/18 08:48 Dose:  3 mg


Azithromycin 500 mg/ Sodium (Chloride)  250 mls @ 250 mls/hr IVPB DAILY [facility]


   PRN Reason: Protocol


   Last Admin: 04/01/18 08:56 Dose:  250 mls/hr


Ceftriaxone Sodium 1 gm/ (Sodium Chloride)  100 mls @ 100 mls/hr IVPB DAILY [facility]


   PRN Reason: Protocol


   Last Admin: 04/01/18 08:51 Dose:  100 mls/hr


Insulin Human Lispro (Humalog)  0 units SC ACHS [facility]


   PRN Reason: Protocol


   Last Admin: 04/01/18 08:44 Dose:  3 units


Insulin Lispro Protam/Lispro Human (Humalog Mix 75/25)  30 units SC BRK [facility]


   Last Admin: 04/01/18 08:43 Dose:  30 units


Insulin Lispro Protam/Lispro Human (Humalog Mix 75/25)  20 units SC DIN [facility]


   Last Admin: 03/31/18 18:00 Dose:  20 units


Methylprednisolone (Solu-Medrol)  40 mg IVP Q8H [facility]


   Last Admin: 04/01/18 03:43 Dose:  40 mg


Metoprolol Tartrate (Lopressor)  100 mg PO Q12 [facility]


   Last Admin: 04/01/18 08:50 Dose:  100 mg


Multivitamins/Minerals (Therapeutic-M Tab)  1 tab PO DAILY [facility]


   Last Admin: 04/01/18 08:49 Dose:  1 tab


Pantoprazole Sodium (Protonix Ec Tab)  40 mg PO ACL [facility]


   Last Admin: 03/31/18 12:00 Dose:  40 mg


Prednisone (Prednisone Tab)  5 mg PO DAILY [facility]


   Last Admin: 04/01/18 08:50 Dose:  5 mg


Pregabalin (Lyrica)  100 mg PO HS [facility]


   Last Admin: 03/31/18 22:13 Dose:  100 mg


Promethazine HCl/Codeine (Phenergan/Codeine Oral Syrup)  10 ml PO Q4 [facility]


Tramadol HCl (Ultram)  50 mg PO DAILY PRN


   PRN Reason: Pain, severe (8-10)


Trimethoprim/Sulfamethoxazole (Bactrim Ds Tab)  1 tab PO MWF [facility]


   PRN Reason: Protocol


   Last Admin: 03/30/18 08:41 Dose:  1 tab











- Labs


Labs: 


 





 03/30/18 05:00 





 03/31/18 06:15 





 











PT  28.9 Seconds (9.8-13.1)  H D 03/31/18  06:15    


 


INR  2.6  (0.9-1.2)  H D 03/31/18  06:15    














- Constitutional


Appears: Chronically Ill





- Head Exam


Head Exam: ATRAUMATIC, NORMAL INSPECTION, NORMOCEPHALIC





- Eye Exam


Eye Exam: EOMI, Normal appearance, PERRL


Pupil Exam: NORMAL ACCOMODATION, PERRL





- ENT Exam


ENT Exam: Mucous Membranes Moist, Normal Exam





- Neck Exam


Neck Exam: Full ROM, Normal Inspection.  absent: Lymphadenopathy





- Respiratory Exam


Respiratory Exam: Decreased Breath Sounds, Prolonged Expiratory Phase, Rales, 

Wheezes, NORMAL BREATHING PATTERN





- Cardiovascular Exam


Cardiovascular Exam: REGULAR RHYTHM, +S1, +S2.  absent: Murmur





- GI/Abdominal Exam


GI & Abdominal Exam: Soft, Normal Bowel Sounds.  absent: Tenderness





- Rectal Exam


Rectal Exam: NORMAL INSPECTION





- Extremities Exam


Extremities Exam: Full ROM, Normal Capillary Refill, Normal Inspection.  absent

: Joint Swelling, Pedal Edema





- Back Exam


Back Exam: NORMAL INSPECTION





- Neurological Exam


Neurological Exam: Alert, Awake, CN II-XII Intact, Normal Gait, Oriented x3





- Psychiatric Exam


Psychiatric exam: Normal Affect, Normal Mood





- Skin


Skin Exam: Dry, Intact, Normal Color, Warm





Assessment and Plan





- Assessment and Plan (Free Text)


Assessment: 





ACUTE EXAC OF ASTHMA


URI


S/P RENAL TRANSPLANT


MUCUS PLUGGING OF AIRWAYS


DM--UNCONTROLLED


Plan: 





CONTINUE RX AS ORDERED


MAY BENEFIT FROM STAY AT TCU FOR FURTHER RX PRIOR TO D/C HOME

## 2018-04-02 ENCOUNTER — HOSPITAL ENCOUNTER (INPATIENT)
Dept: HOSPITAL 14 - H.TCU | Age: 53
LOS: 7 days | Discharge: HOME | DRG: 202 | End: 2018-04-09
Attending: INTERNAL MEDICINE | Admitting: INTERNAL MEDICINE
Payer: COMMERCIAL

## 2018-04-02 VITALS
RESPIRATION RATE: 20 BRPM | OXYGEN SATURATION: 97 % | SYSTOLIC BLOOD PRESSURE: 154 MMHG | HEART RATE: 77 BPM | DIASTOLIC BLOOD PRESSURE: 76 MMHG

## 2018-04-02 VITALS — BODY MASS INDEX: 34.6 KG/M2

## 2018-04-02 VITALS — TEMPERATURE: 97.7 F

## 2018-04-02 DIAGNOSIS — J45.21: Primary | ICD-10-CM

## 2018-04-02 DIAGNOSIS — E11.65: ICD-10-CM

## 2018-04-02 DIAGNOSIS — R19.7: ICD-10-CM

## 2018-04-02 DIAGNOSIS — I48.0: ICD-10-CM

## 2018-04-02 DIAGNOSIS — E78.5: ICD-10-CM

## 2018-04-02 DIAGNOSIS — I10: ICD-10-CM

## 2018-04-02 DIAGNOSIS — E11.42: ICD-10-CM

## 2018-04-02 DIAGNOSIS — Z94.0: ICD-10-CM

## 2018-04-02 DIAGNOSIS — Z86.73: ICD-10-CM

## 2018-04-02 DIAGNOSIS — J06.9: ICD-10-CM

## 2018-04-02 DIAGNOSIS — E78.00: ICD-10-CM

## 2018-04-02 DIAGNOSIS — I25.2: ICD-10-CM

## 2018-04-02 DIAGNOSIS — E11.51: ICD-10-CM

## 2018-04-02 DIAGNOSIS — I25.10: ICD-10-CM

## 2018-04-02 DIAGNOSIS — Z95.1: ICD-10-CM

## 2018-04-02 LAB
INR PPP: 4.4 (ref 0.9–1.2)
PROTHROMBIN TIME: 50.7 SECONDS (ref 9.8–13.1)

## 2018-04-02 PROCEDURE — 5A0955Z ASSISTANCE WITH RESPIRATORY VENTILATION, GREATER THAN 96 CONSECUTIVE HOURS: ICD-10-PCS | Performed by: INTERNAL MEDICINE

## 2018-04-02 PROCEDURE — F07L6ZZ THERAPEUTIC EXERCISE TREATMENT OF MUSCULOSKELETAL SYSTEM - LOWER BACK / LOWER EXTREMITY: ICD-10-PCS | Performed by: INTERNAL MEDICINE

## 2018-04-02 PROCEDURE — F07Z9ZZ GAIT TRAINING/FUNCTIONAL AMBULATION TREATMENT: ICD-10-PCS | Performed by: INTERNAL MEDICINE

## 2018-04-02 RX ADMIN — METHYLPREDNISOLONE SODIUM SUCCINATE SCH MG: 40 INJECTION, POWDER, FOR SOLUTION INTRAMUSCULAR; INTRAVENOUS at 04:26

## 2018-04-02 RX ADMIN — VITAMIN D, TAB 1000IU (100/BT) SCH INTLU: 25 TAB at 09:56

## 2018-04-02 RX ADMIN — PROMETHAZINE HYDROCHLORIDE AND CODEINE PHOSPHATE SCH ML: 6.25; 1 SOLUTION ORAL at 22:07

## 2018-04-02 RX ADMIN — IPRATROPIUM BROMIDE AND ALBUTEROL SULFATE SCH ML: .5; 3 SOLUTION RESPIRATORY (INHALATION) at 03:55

## 2018-04-02 RX ADMIN — IPRATROPIUM BROMIDE AND ALBUTEROL SULFATE SCH ML: .5; 3 SOLUTION RESPIRATORY (INHALATION) at 01:14

## 2018-04-02 RX ADMIN — DILTIAZEM HYDROCHLORIDE SCH MG: 240 CAPSULE, COATED, EXTENDED RELEASE ORAL at 09:56

## 2018-04-02 RX ADMIN — METHYLPREDNISOLONE SODIUM SUCCINATE SCH MG: 40 INJECTION, POWDER, FOR SOLUTION INTRAMUSCULAR; INTRAVENOUS at 20:30

## 2018-04-02 RX ADMIN — PROMETHAZINE HYDROCHLORIDE AND CODEINE PHOSPHATE SCH: 6.25; 1 SOLUTION ORAL at 06:09

## 2018-04-02 RX ADMIN — INSULIN LISPRO SCH UNITS: 100 INJECTION, SOLUTION INTRAVENOUS; SUBCUTANEOUS at 14:49

## 2018-04-02 RX ADMIN — INSULIN LISPRO SCH UNITS: 100 INJECTION, SOLUTION INTRAVENOUS; SUBCUTANEOUS at 09:58

## 2018-04-02 RX ADMIN — PROMETHAZINE HYDROCHLORIDE AND CODEINE PHOSPHATE SCH ML: 6.25; 1 SOLUTION ORAL at 14:51

## 2018-04-02 RX ADMIN — ACETYLCYSTEINE SCH ML: 200 INHALANT RESPIRATORY (INHALATION) at 07:21

## 2018-04-02 RX ADMIN — PROMETHAZINE HYDROCHLORIDE AND CODEINE PHOSPHATE SCH ML: 6.25; 1 SOLUTION ORAL at 01:51

## 2018-04-02 RX ADMIN — INSULIN LISPRO SCH UNITS: 100 INJECTION, SUSPENSION SUBCUTANEOUS at 09:58

## 2018-04-02 RX ADMIN — Medication SCH TAB: at 09:56

## 2018-04-02 RX ADMIN — METHYLPREDNISOLONE SODIUM SUCCINATE SCH MG: 40 INJECTION, POWDER, FOR SOLUTION INTRAMUSCULAR; INTRAVENOUS at 13:11

## 2018-04-02 RX ADMIN — PANTOPRAZOLE SODIUM SCH MG: 40 TABLET, DELAYED RELEASE ORAL at 12:52

## 2018-04-02 RX ADMIN — SULFAMETHOXAZOLE AND TRIMETHOPRIM SCH TAB: 800; 160 TABLET ORAL at 09:57

## 2018-04-02 RX ADMIN — IPRATROPIUM BROMIDE AND ALBUTEROL SULFATE SCH ML: .5; 3 SOLUTION RESPIRATORY (INHALATION) at 19:17

## 2018-04-02 RX ADMIN — IPRATROPIUM BROMIDE AND ALBUTEROL SULFATE SCH ML: .5; 3 SOLUTION RESPIRATORY (INHALATION) at 11:29

## 2018-04-02 RX ADMIN — PROMETHAZINE HYDROCHLORIDE AND CODEINE PHOSPHATE SCH: 6.25; 1 SOLUTION ORAL at 12:46

## 2018-04-02 RX ADMIN — ACETYLCYSTEINE SCH ML: 200 INHALANT RESPIRATORY (INHALATION) at 19:19

## 2018-04-02 RX ADMIN — INSULIN LISPRO SCH UNITS: 100 INJECTION, SUSPENSION SUBCUTANEOUS at 18:15

## 2018-04-02 RX ADMIN — IPRATROPIUM BROMIDE AND ALBUTEROL SULFATE SCH ML: .5; 3 SOLUTION RESPIRATORY (INHALATION) at 07:21

## 2018-04-02 RX ADMIN — INSULIN LISPRO SCH: 100 INJECTION, SOLUTION INTRAVENOUS; SUBCUTANEOUS at 22:10

## 2018-04-02 NOTE — CP.PCM.PN
Subjective





- Date & Time of Evaluation


Date of Evaluation: 04/02/18


Time of Evaluation: 08:49





- Subjective


Subjective: 





CONTINUES TO COUGH AND HAS SOB AT REST


NO CHEST PAINS





Objective





- Vital Signs/Intake and Output


Vital Signs (last 24 hours): 


 











Temp Pulse Resp BP Pulse Ox


 


 97.4 F L  72   20   133/72   92 L


 


 04/02/18 05:00  04/02/18 05:00  04/02/18 05:00  04/02/18 05:00  04/02/18 05:00











- Medications


Medications: 


 Current Medications





Acetaminophen (Tylenol 325mg Tab)  650 mg PO Q6 PRN


   PRN Reason:  temp 101


Acetylcysteine (Acetylcysteine 20%)  2 ml INH RBID [facility]


   Last Admin: 04/02/18 07:21 Dose:  2 ml


Albuterol/Ipratropium (Duoneb 3 Mg/0.5 Mg (3 Ml) Ud)  3 ml INH RQ4 [facility]


   Last Admin: 04/02/18 07:21 Dose:  3 ml


Alprazolam (Xanax)  0.5 mg PO HS PRN


   PRN Reason: Anxiety


Aspirin (Ecotrin)  81 mg PO DAILY [facility]


   Last Admin: 04/01/18 08:49 Dose:  81 mg


Atorvastatin Calcium (Lipitor)  20 mg PO HS [facility]


   Last Admin: 04/01/18 21:36 Dose:  20 mg


Cholecalciferol (Vitamin D)  2,000 intlu PO DAILY [facility]


   Last Admin: 04/01/18 08:50 Dose:  2,000 intlu


Diltiazem HCl (Cardizem Cd)  240 mg PO DAILY [facility]


   Last Admin: 04/01/18 08:49 Dose:  240 mg


Home Med (Mycophenolate Sodium [Mycophenolic Acid])  540 mg PO Q12 [facility]


   Last Admin: 04/01/18 21:37 Dose:  540 mg


Home Med (Tacrolimus [Astagraf Xl])  3 mg PO DAILY@0700 [facility]


   Last Admin: 04/01/18 08:48 Dose:  3 mg


Azithromycin 500 mg/ Sodium (Chloride)  250 mls @ 250 mls/hr IVPB DAILY [facility]


   PRN Reason: Protocol


   Last Admin: 04/01/18 08:56 Dose:  250 mls/hr


Ceftriaxone Sodium 1 gm/ (Sodium Chloride)  100 mls @ 100 mls/hr IVPB DAILY [facility]


   PRN Reason: Protocol


   Last Admin: 04/01/18 08:51 Dose:  100 mls/hr


Insulin Human Lispro (Humalog)  0 units SC ACHS [facility]


   PRN Reason: Protocol


   Last Admin: 04/01/18 21:38 Dose:  Not Given


Insulin Lispro Protam/Lispro Human (Humalog Mix 75/25)  30 units SC BRK [facility]


   Last Admin: 04/01/18 08:43 Dose:  30 units


Insulin Lispro Protam/Lispro Human (Humalog Mix 75/25)  20 units SC DIN [facility]


   Last Admin: 04/01/18 17:29 Dose:  20 units


Methylprednisolone (Solu-Medrol)  40 mg IVP Q8H [facility]


   Last Admin: 04/02/18 04:26 Dose:  40 mg


Metoprolol Tartrate (Lopressor)  100 mg PO Q12 [facility]


   Last Admin: 04/01/18 21:36 Dose:  100 mg


Multivitamins/Minerals (Therapeutic-M Tab)  1 tab PO DAILY [facility]


   Last Admin: 04/01/18 08:49 Dose:  1 tab


Pantoprazole Sodium (Protonix Ec Tab)  40 mg PO ACL [facility]


   Last Admin: 04/01/18 11:45 Dose:  40 mg


Prednisone (Prednisone Tab)  5 mg PO DAILY [facility]


   Last Admin: 04/01/18 08:50 Dose:  5 mg


Pregabalin (Lyrica)  100 mg PO HS [facility]


   Last Admin: 04/01/18 21:36 Dose:  100 mg


Promethazine HCl/Codeine (Phenergan/Codeine Oral Syrup)  10 ml PO Q4 [facility]


   Last Admin: 04/02/18 06:09 Dose:  Not Given


Tramadol HCl (Ultram)  50 mg PO DAILY PRN


   PRN Reason: Pain, severe (8-10)


Trimethoprim/Sulfamethoxazole (Bactrim Ds Tab)  1 tab PO MWF [facility]


   PRN Reason: Protocol


   Last Admin: 03/30/18 08:41 Dose:  1 tab











- Labs


Labs: 


 





 03/30/18 05:00 





 03/31/18 06:15 





 











PT  50.7 Seconds (9.8-13.1)  H* D 04/02/18  04:20    


 


INR  4.4  (0.9-1.2)  H  04/02/18  04:20    














- Constitutional


Appears: In Acute Distress





- Head Exam


Head Exam: ATRAUMATIC, NORMAL INSPECTION, NORMOCEPHALIC





- Eye Exam


Eye Exam: EOMI, Normal appearance, PERRL


Pupil Exam: NORMAL ACCOMODATION, PERRL





- ENT Exam


ENT Exam: Mucous Membranes Moist, Normal Exam





- Neck Exam


Neck Exam: Full ROM, Normal Inspection.  absent: Lymphadenopathy





- Respiratory Exam


Respiratory Exam: Decreased Breath Sounds, Prolonged Expiratory Phase, Rales, 

Wheezes, NORMAL BREATHING PATTERN





- Cardiovascular Exam


Cardiovascular Exam: REGULAR RHYTHM, +S1, +S2.  absent: Murmur





- GI/Abdominal Exam


GI & Abdominal Exam: Soft, Normal Bowel Sounds.  absent: Tenderness





- Rectal Exam


Rectal Exam: NORMAL INSPECTION





- Extremities Exam


Extremities Exam: Full ROM, Normal Capillary Refill, Normal Inspection, Pedal 

Edema.  absent: Joint Swelling





- Back Exam


Back Exam: NORMAL INSPECTION





- Neurological Exam


Neurological Exam: Alert, Awake, CN II-XII Intact, Normal Gait, Oriented x3





- Psychiatric Exam


Psychiatric exam: Normal Affect, Normal Mood





- Skin


Skin Exam: Dry, Intact, Normal Color, Warm





Assessment and Plan





- Assessment and Plan (Free Text)


Assessment: 





ACUTE ASTHMA


URI


UNCONTROLLED TYPE 2 DM


S/P RENAL TRANSPLANT


PAROXYSMAL A.FIB--CHRONIC


ELEVATED PT/INR--DUE TO COUMADIN RX


Plan: 





CONTINUE CURRENT RX


HPLD COUMADIN FOR NOW


DAILY PT/INR


 FOR TCU

## 2018-04-03 LAB
INR PPP: 2.8 (ref 0.9–1.2)
PROTHROMBIN TIME: 31.5 SECONDS (ref 9.8–13.1)

## 2018-04-03 RX ADMIN — IPRATROPIUM BROMIDE AND ALBUTEROL SULFATE SCH ML: .5; 3 SOLUTION RESPIRATORY (INHALATION) at 08:22

## 2018-04-03 RX ADMIN — IPRATROPIUM BROMIDE AND ALBUTEROL SULFATE SCH ML: .5; 3 SOLUTION RESPIRATORY (INHALATION) at 11:33

## 2018-04-03 RX ADMIN — ACETYLCYSTEINE SCH ML: 200 INHALANT RESPIRATORY (INHALATION) at 19:39

## 2018-04-03 RX ADMIN — IPRATROPIUM BROMIDE AND ALBUTEROL SULFATE SCH ML: .5; 3 SOLUTION RESPIRATORY (INHALATION) at 15:31

## 2018-04-03 RX ADMIN — VITAMIN D, TAB 1000IU (100/BT) SCH INTLU: 25 TAB at 08:36

## 2018-04-03 RX ADMIN — PROMETHAZINE HYDROCHLORIDE AND CODEINE PHOSPHATE SCH ML: 6.25; 1 SOLUTION ORAL at 17:20

## 2018-04-03 RX ADMIN — PANTOPRAZOLE SODIUM SCH MG: 40 TABLET, DELAYED RELEASE ORAL at 11:44

## 2018-04-03 RX ADMIN — PROMETHAZINE HYDROCHLORIDE AND CODEINE PHOSPHATE SCH: 6.25; 1 SOLUTION ORAL at 02:00

## 2018-04-03 RX ADMIN — INSULIN LISPRO SCH U: 100 INJECTION, SOLUTION INTRAVENOUS; SUBCUTANEOUS at 21:02

## 2018-04-03 RX ADMIN — PROMETHAZINE HYDROCHLORIDE AND CODEINE PHOSPHATE SCH ML: 6.25; 1 SOLUTION ORAL at 12:55

## 2018-04-03 RX ADMIN — PROMETHAZINE HYDROCHLORIDE AND CODEINE PHOSPHATE SCH ML: 6.25; 1 SOLUTION ORAL at 05:03

## 2018-04-03 RX ADMIN — PROMETHAZINE HYDROCHLORIDE AND CODEINE PHOSPHATE SCH ML: 6.25; 1 SOLUTION ORAL at 08:38

## 2018-04-03 RX ADMIN — INSULIN LISPRO SCH U: 100 INJECTION, SOLUTION INTRAVENOUS; SUBCUTANEOUS at 11:43

## 2018-04-03 RX ADMIN — Medication SCH TAB: at 08:36

## 2018-04-03 RX ADMIN — WATER SCH MLS/HR: 1 INJECTION INTRAMUSCULAR; INTRAVENOUS; SUBCUTANEOUS at 17:12

## 2018-04-03 RX ADMIN — AZITHROMYCIN SCH MLS/HR: 500 INJECTION, POWDER, LYOPHILIZED, FOR SOLUTION INTRAVENOUS at 21:40

## 2018-04-03 RX ADMIN — HYDROCORTISONE SCH APPLIC: 25 CREAM TOPICAL at 17:11

## 2018-04-03 RX ADMIN — IPRATROPIUM BROMIDE AND ALBUTEROL SULFATE SCH ML: .5; 3 SOLUTION RESPIRATORY (INHALATION) at 04:10

## 2018-04-03 RX ADMIN — METHYLPREDNISOLONE SODIUM SUCCINATE SCH MG: 40 INJECTION, POWDER, FOR SOLUTION INTRAMUSCULAR; INTRAVENOUS at 20:20

## 2018-04-03 RX ADMIN — IPRATROPIUM BROMIDE AND ALBUTEROL SULFATE SCH ML: .5; 3 SOLUTION RESPIRATORY (INHALATION) at 00:12

## 2018-04-03 RX ADMIN — ACETYLCYSTEINE SCH ML: 200 INHALANT RESPIRATORY (INHALATION) at 08:22

## 2018-04-03 RX ADMIN — PROMETHAZINE HYDROCHLORIDE AND CODEINE PHOSPHATE SCH ML: 6.25; 1 SOLUTION ORAL at 20:59

## 2018-04-03 RX ADMIN — IPRATROPIUM BROMIDE AND ALBUTEROL SULFATE SCH ML: .5; 3 SOLUTION RESPIRATORY (INHALATION) at 19:39

## 2018-04-03 RX ADMIN — METHYLPREDNISOLONE SODIUM SUCCINATE SCH MG: 40 INJECTION, POWDER, FOR SOLUTION INTRAMUSCULAR; INTRAVENOUS at 11:44

## 2018-04-03 RX ADMIN — INSULIN LISPRO SCH U: 100 INJECTION, SOLUTION INTRAVENOUS; SUBCUTANEOUS at 17:16

## 2018-04-03 RX ADMIN — INSULIN LISPRO SCH UNITS: 100 INJECTION, SUSPENSION SUBCUTANEOUS at 08:34

## 2018-04-03 RX ADMIN — INSULIN LISPRO SCH U: 100 INJECTION, SOLUTION INTRAVENOUS; SUBCUTANEOUS at 06:53

## 2018-04-03 RX ADMIN — INSULIN LISPRO SCH UNITS: 100 INJECTION, SUSPENSION SUBCUTANEOUS at 17:17

## 2018-04-03 RX ADMIN — DILTIAZEM HYDROCHLORIDE SCH MG: 240 CAPSULE, COATED, EXTENDED RELEASE ORAL at 08:34

## 2018-04-03 RX ADMIN — METHYLPREDNISOLONE SODIUM SUCCINATE SCH MG: 40 INJECTION, POWDER, FOR SOLUTION INTRAMUSCULAR; INTRAVENOUS at 04:30

## 2018-04-03 NOTE — CP.PCM.DIS
Provider





- Provider


Date of Admission: 


03/29/18 14:56





Attending physician: 


Bismark Sanchez MD





Time Spent in preparation of Discharge (in minutes): 35





Diagnosis





- Discharge Diagnosis


(1) Renal transplant recipient


Status: Acute   





(2) Asthma


Status: Acute   





(3) CAD (coronary artery disease)


Status: Acute   





(4) CVA (cerebral vascular accident)


Status: Acute   





(5) Cough


Status: Acute   





(6) Dyspnea and respiratory abnormalities


Status: Acute   





(7) Hx of CABG


Status: Acute   





(8) Hypertension


Status: Acute   





(9) Upper respiratory infection


Status: Acute   





Hospital Course





- Lab Results


Lab Results: 


 Micro Results





03/31/18 01:45   Sputum   Gram Stain - Final


03/31/18 01:45   Sputum   Sputum Culture - Final


                            NORMAL ORAL CARMELA


03/30/18 11:38   Blood-Venous   Blood Culture - Preliminary


                            NO GROWTH AFTER 3 DAYS


03/30/18 11:38   Blood-Venous   Blood Culture - Preliminary


                            NO GROWTH AFTER 3 DAYS


03/29/18 13:54   Urine   Urine Culture - Final


                            No Growth (<1,000 CFU/ML)





 Most Recent Lab Values











WBC  6.8 K/uL (4.8-10.8)   03/30/18  05:00    


 


RBC  5.05 Mil/uL (4.40-5.90)   03/30/18  05:00    


 


Hgb  14.3 g/dL (12.0-18.0)   03/30/18  05:00    


 


Hct  44.1 % (35.0-51.0)   03/30/18  05:00    


 


MCV  87.2 fl (80.0-94.0)   03/30/18  05:00    


 


MCH  28.3 pg (27.0-31.0)   03/30/18  05:00    


 


MCHC  32.5 g/dL (33.0-37.0)  L  03/30/18  05:00    


 


RDW  16.1 % (11.5-14.5)  H  03/30/18  05:00    


 


Plt Count  163 K/uL (130-400)   03/30/18  05:00    


 


MPV  8.8 fl (7.2-11.7)   03/29/18  14:15    


 


Neut % (Auto)  87.7 % (50.0-75.0)  H  03/29/18  14:15    


 


Lymph % (Auto)  2.2 % (20.0-40.0)  L  03/29/18  14:15    


 


Mono % (Auto)  8.6 % (0.0-10.0)   03/29/18  14:15    


 


Eos % (Auto)  0.8 % (0.0-4.0)   03/29/18  14:15    


 


Baso % (Auto)  0.7 % (0.0-2.0)   03/29/18  14:15    


 


Neut # (Auto)  5.7 K/uL (1.8-7.0)   03/29/18  14:15    


 


Lymph # (Auto)  0.1 K/uL (1.0-4.3)  L  03/29/18  14:15    


 


Mono # (Auto)  0.6 K/uL (0.0-0.8)   03/29/18  14:15    


 


Eos # (Auto)  0.1 K/uL (0.0-0.7)   03/29/18  14:15    


 


Baso # (Auto)  0.0 K/uL (0.0-0.2)   03/29/18  14:15    


 


Neutrophils % (Manual)  86 % (42-75)  H  03/29/18  14:15    


 


Lymphocytes % (Manual)  3 % (20-50)  L  03/29/18  14:15    


 


Monocytes % (Manual)  8 % (0-10)   03/29/18  14:15    


 


Eosinophils % (Manual)  2 % (0-7)   03/29/18  14:15    


 


Basophils % (Manual)  1 % (0-2)   03/29/18  14:15    


 


Platelet Estimate  Normal  (NORMAL)   03/29/18  14:15    


 


Anisocytosis (manual)  Slight   03/29/18  14:15    


 


PT  50.7 Seconds (9.8-13.1)  H* D 04/02/18  04:20    


 


INR  4.4  (0.9-1.2)  H  04/02/18  04:20    


 


Sodium  134 mmol/l (132-148)   03/31/18  06:15    


 


Potassium  4.7 MMOL/L (3.6-5.0)   03/31/18  06:15    


 


Chloride  99 mmol/L ()   03/31/18  06:15    


 


Carbon Dioxide  27 mmol/L (22-30)   03/31/18  06:15    


 


Anion Gap  13  (10-20)   03/31/18  06:15    


 


BUN  34 mg/dl (9-20)  H  03/31/18  06:15    


 


Creatinine  1.2 mg/dl (0.8-1.5)   03/31/18  06:15    


 


Est GFR ( Amer)  > 60   03/31/18  06:15    


 


Est GFR (Non-Af Amer)  > 60   03/31/18  06:15    


 


POC Glucose (mg/dL)  413 mg/dL ()  H*  04/02/18  11:34    


 


Random Glucose  350 mg/dL ()  H  03/31/18  06:15    


 


Calcium  10.1 mg/dL (8.4-10.2)   03/31/18  06:15    


 


Total Bilirubin  0.6 mg/dl (0.2-1.3)   03/29/18  14:15    


 


AST  29 U/L (17-59)   03/29/18  14:15    


 


ALT  31 U/L (21-72)   03/29/18  14:15    


 


Alkaline Phosphatase  99 U/L ()   03/29/18  14:15    


 


Troponin I  0.0340 ng/mL (0.00-0.120)   03/29/18  14:15    


 


NT-Pro-B Natriuret Pep  1310 pg/ml (0-900)  H  03/29/18  18:00    


 


Total Protein  6.6 G/DL (6.3-8.2)   03/29/18  14:15    


 


Albumin  3.8 g/dL (3.5-5.0)   03/29/18  14:15    


 


Globulin  2.8 gm/dL (2.2-3.9)   03/29/18  14:15    


 


Albumin/Globulin Ratio  1.3  (1.0-2.1)   03/29/18  14:15    


 


Triglycerides  97 mg/DL (0-149)  D 03/30/18  05:00    


 


Cholesterol  165 mg/dL (0-199)   03/30/18  05:00    


 


LDL Cholesterol Direct  114 mg/dL (0-129)   03/30/18  05:00    


 


HDL Cholesterol  36 MG/DL (30-70)   03/30/18  05:00    


 


Urine Color  Yellow  (YELLOW)   03/29/18  13:54    


 


Urine Clarity  Slighty-cloudy  (Clear)   03/29/18  13:54    


 


Urine pH  6.0  (5.0-8.0)   03/29/18  13:54    


 


Ur Specific Gravity  1.022  (1.003-1.030)   03/29/18  13:54    


 


Urine Protein  30 mg/dL (NEGATIVE)   03/29/18  13:54    


 


Urine Glucose (UA)  >=500 mg/dL (Normal)   03/29/18  13:54    


 


Urine Ketones  Negative mg/dL (NEGATIVE)   03/29/18  13:54    


 


Urine Blood  Negative  (NEGATIVE)   03/29/18  13:54    


 


Urine Nitrate  Negative  (NEGATIVE)   03/29/18  13:54    


 


Urine Bilirubin  Negative  (NEGATIVE)   03/29/18  13:54    


 


Urine Urobilinogen  0.2-1.0 mg/dL (0.2-1.0)   03/29/18  13:54    


 


Ur Leukocyte Esterase  Small Ronel/uL (Negative)   03/29/18  13:54    


 


Urine RBC (Auto)  2 /hpf (0-3)   03/29/18  13:54    


 


Urine Microscopic WBC  9 /hpf (0-5)  H  03/29/18  13:54    


 


Ur Squamous Epith Cells  2 /hpf (0-5)   03/29/18  13:54    


 


Amorphous Sediment  Few /ul (<OCC)  H  03/29/18  13:54    


 


Urine Bacteria  Few  (<OCC)  H  03/29/18  13:54    


 


Granular Casts (Auto)  1 /lpf (0-1)   03/29/18  13:54    


 


Influenza Typ A,B (EIA)  Negative for flu a/b  (NEGATIVE)   03/29/18  13:54    














- Hospital Course


Hospital Course: 





SOB AND COUGH PERSIST





Discharge Exam





- Head Exam


Head Exam: ATRAUMATIC, NORMAL INSPECTION, NORMOCEPHALIC





- Eye Exam


Eye Exam: EOMI, Normal appearance, PERRL


Pupil Exam: NORMAL ACCOMODATION, PERRL





- Respiratory Exam


Respiratory Exam: Decreased Breath Sounds, Rales, Wheezes, NORMAL BREATHING 

PATTERN





- GI/Abdominal Exam


GI & Abdominal Exam: Normal Bowel Sounds





- Rectal Exam


Rectal Exam: NORMAL INSPECTION





- Extremities Exam


Extremities exam: pedal edema





- Neurological Exam


Neurological exam: Alert, CN II-XII Intact, Normal Gait, Oriented x3, Reflexes 

Normal





- Psychiatric Exam


Psychiatric exam: Normal Affect, Normal Mood





- Skin


Skin Exam: Dry, Intact, Normal Color, Warm





Discharge Plan





- Discharge Medications


Prescriptions: 


cefTRIAXone 1 gm [Rocephin 1 gram IVPB] 1 gm IVPB DAILY #5 bag


Azithromycin 500MG/NS 250ml [Zithromax 500mg in NS] 500 mg IV DAILY #5 bag





- Follow Up Plan


Condition: FAIR


Disposition: TRANSF TO SNF


Patient education suggested?: Yes


Additional Instructions: 


TRANSFER TO TRANSITIONAL CARE UNIT FOR IV ANTIBIOTICS AND STEROIDS

## 2018-04-04 LAB
BUN SERPL-MCNC: 40 MG/DL (ref 9–20)
CALCIUM SERPL-MCNC: 9.7 MG/DL (ref 8.4–10.2)
ERYTHROCYTE [DISTWIDTH] IN BLOOD BY AUTOMATED COUNT: 15.9 % (ref 11.5–14.5)
GFR NON-AFRICAN AMERICAN: > 60
HGB BLD-MCNC: 14.8 G/DL (ref 12–18)
INR PPP: 2.7 (ref 0.9–1.2)
MCH RBC QN AUTO: 28.1 PG (ref 27–31)
MCHC RBC AUTO-ENTMCNC: 32.6 G/DL (ref 33–37)
MCV RBC AUTO: 86.2 FL (ref 80–94)
PLATELET # BLD: 199 K/UL (ref 130–400)
PROTHROMBIN TIME: 30.3 SECONDS (ref 9.8–13.1)
RBC # BLD AUTO: 5.28 MIL/UL (ref 4.4–5.9)
WBC # BLD AUTO: 9.8 K/UL (ref 4.8–10.8)

## 2018-04-04 RX ADMIN — IPRATROPIUM BROMIDE AND ALBUTEROL SULFATE SCH ML: .5; 3 SOLUTION RESPIRATORY (INHALATION) at 07:13

## 2018-04-04 RX ADMIN — INSULIN LISPRO SCH UNITS: 100 INJECTION, SUSPENSION SUBCUTANEOUS at 08:51

## 2018-04-04 RX ADMIN — IPRATROPIUM BROMIDE AND ALBUTEROL SULFATE SCH ML: .5; 3 SOLUTION RESPIRATORY (INHALATION) at 19:30

## 2018-04-04 RX ADMIN — PROMETHAZINE HYDROCHLORIDE AND CODEINE PHOSPHATE SCH: 6.25; 1 SOLUTION ORAL at 01:30

## 2018-04-04 RX ADMIN — DILTIAZEM HYDROCHLORIDE SCH MG: 240 CAPSULE, COATED, EXTENDED RELEASE ORAL at 08:50

## 2018-04-04 RX ADMIN — PROMETHAZINE HYDROCHLORIDE AND CODEINE PHOSPHATE SCH ML: 6.25; 1 SOLUTION ORAL at 04:55

## 2018-04-04 RX ADMIN — SULFAMETHOXAZOLE AND TRIMETHOPRIM SCH TAB: 800; 160 TABLET ORAL at 08:58

## 2018-04-04 RX ADMIN — Medication SCH TAB: at 08:50

## 2018-04-04 RX ADMIN — PROMETHAZINE HYDROCHLORIDE AND CODEINE PHOSPHATE SCH ML: 6.25; 1 SOLUTION ORAL at 11:30

## 2018-04-04 RX ADMIN — INSULIN LISPRO SCH U: 100 INJECTION, SOLUTION INTRAVENOUS; SUBCUTANEOUS at 06:48

## 2018-04-04 RX ADMIN — IPRATROPIUM BROMIDE AND ALBUTEROL SULFATE SCH ML: .5; 3 SOLUTION RESPIRATORY (INHALATION) at 11:12

## 2018-04-04 RX ADMIN — HYDROCORTISONE SCH APPLIC: 25 CREAM TOPICAL at 08:52

## 2018-04-04 RX ADMIN — IPRATROPIUM BROMIDE AND ALBUTEROL SULFATE SCH ML: .5; 3 SOLUTION RESPIRATORY (INHALATION) at 04:44

## 2018-04-04 RX ADMIN — ACETYLCYSTEINE SCH ML: 200 INHALANT RESPIRATORY (INHALATION) at 19:30

## 2018-04-04 RX ADMIN — PANTOPRAZOLE SODIUM SCH MG: 40 TABLET, DELAYED RELEASE ORAL at 11:38

## 2018-04-04 RX ADMIN — AZITHROMYCIN SCH MLS/HR: 500 INJECTION, POWDER, LYOPHILIZED, FOR SOLUTION INTRAVENOUS at 20:22

## 2018-04-04 RX ADMIN — INSULIN LISPRO SCH: 100 INJECTION, SOLUTION INTRAVENOUS; SUBCUTANEOUS at 21:54

## 2018-04-04 RX ADMIN — METHYLPREDNISOLONE SODIUM SUCCINATE SCH MG: 40 INJECTION, POWDER, FOR SOLUTION INTRAMUSCULAR; INTRAVENOUS at 20:22

## 2018-04-04 RX ADMIN — METHYLPREDNISOLONE SODIUM SUCCINATE SCH MG: 40 INJECTION, POWDER, FOR SOLUTION INTRAMUSCULAR; INTRAVENOUS at 12:46

## 2018-04-04 RX ADMIN — INSULIN LISPRO SCH UNITS: 100 INJECTION, SUSPENSION SUBCUTANEOUS at 16:46

## 2018-04-04 RX ADMIN — HYDROCORTISONE SCH APPLIC: 25 CREAM TOPICAL at 16:45

## 2018-04-04 RX ADMIN — INSULIN LISPRO SCH U: 100 INJECTION, SOLUTION INTRAVENOUS; SUBCUTANEOUS at 11:28

## 2018-04-04 RX ADMIN — IPRATROPIUM BROMIDE AND ALBUTEROL SULFATE SCH ML: .5; 3 SOLUTION RESPIRATORY (INHALATION) at 23:29

## 2018-04-04 RX ADMIN — IPRATROPIUM BROMIDE AND ALBUTEROL SULFATE SCH ML: .5; 3 SOLUTION RESPIRATORY (INHALATION) at 00:29

## 2018-04-04 RX ADMIN — METHYLPREDNISOLONE SODIUM SUCCINATE SCH MG: 40 INJECTION, POWDER, FOR SOLUTION INTRAMUSCULAR; INTRAVENOUS at 04:29

## 2018-04-04 RX ADMIN — IPRATROPIUM BROMIDE AND ALBUTEROL SULFATE SCH ML: .5; 3 SOLUTION RESPIRATORY (INHALATION) at 15:47

## 2018-04-04 RX ADMIN — VITAMIN D, TAB 1000IU (100/BT) SCH INTLU: 25 TAB at 08:49

## 2018-04-04 RX ADMIN — PROMETHAZINE HYDROCHLORIDE AND CODEINE PHOSPHATE SCH ML: 6.25; 1 SOLUTION ORAL at 20:26

## 2018-04-04 RX ADMIN — INSULIN LISPRO SCH U: 100 INJECTION, SOLUTION INTRAVENOUS; SUBCUTANEOUS at 16:45

## 2018-04-04 RX ADMIN — PROMETHAZINE HYDROCHLORIDE AND CODEINE PHOSPHATE SCH: 6.25; 1 SOLUTION ORAL at 13:52

## 2018-04-04 RX ADMIN — WATER SCH MLS/HR: 1 INJECTION INTRAMUSCULAR; INTRAVENOUS; SUBCUTANEOUS at 16:50

## 2018-04-04 RX ADMIN — PROMETHAZINE HYDROCHLORIDE AND CODEINE PHOSPHATE SCH ML: 6.25; 1 SOLUTION ORAL at 19:01

## 2018-04-04 RX ADMIN — ACETYLCYSTEINE SCH ML: 200 INHALANT RESPIRATORY (INHALATION) at 07:13

## 2018-04-04 NOTE — HP
HISTORY OF PRESENT ILLNESS:  Mr. Mena is a 53 year-old male who was

admitted originally to the medical floor then transferred to the

Transitional Care Unit because of shortness of breath, exercise

intolerance, cough, and chest tightness for several days prior to

presentation.  He was admitted to telemetry, treated for several days with

IV antibiotics as well as bronchodilators and oxygen, but symptoms,

however, worsened, was therefore transferred to Transitional Care Unit for

IV antibiotic therapy, physical therapy, and appropriate medical care.



PAST MEDICAL HISTORY:  Coronary artery bypass graft, status post recent

renal transplantation, paroxysmal atrial fibrillation, asthma, coronary

artery disease, congestive heart failure, diabetes mellitus, hypertension,

hyperlipidemia, anxiety disorder, and peripheral neuropathy.



FAMILY HISTORY:  Noncontributory.



SOCIAL HISTORY:  He does not drink or smoke and lives at home with his

wife.



REVIEW OF SYSTEMS:  Essentially remarkable for unsteadiness of gait,

occasional shortness of breath, and exercise intolerance.



PHYSICAL EXAMINATION:

GENERAL:  The patient is alert and oriented, still has some shortness of

breath at rest and on mild exertion.

VITAL SIGNS:  Blood pressure of 154/76, pulse of 77, and respiratory rate

of 20.  He is febrile.  O2 sat is 97% on room air.

SKIN:  Shows fair turgor.

HEENT:  Pupils are equal and reactive to light and accommodation.  Mouth

shows fair hygiene with mucous engorgement of pharynx.

NECK:  JVP flat.

LUNGS:  Poor aeration bilaterally with scattered rales and wheezing and

dullness at both bases.

HEART:  Regular.  No murmurs or gallops.  There is a scar of coronary

artery bypass graft over the anterior chest wall.

ABDOMEN:  Soft and nontender, no organomegaly.

EXTREMITIES:  Show 2+ pitting pedal edema.

CENTRAL NERVOUS SYSTEM:  The patient is alert and oriented, still has

unsteadiness of gait, and otherwise, no gross deficits.



LABORATORY DATA:  WBC 6.8, hemoglobin 14.3, and platelet count 163,000. 

Electrolytes are pending.  Serum glucose is 413.  INR is 4.4.



















IMPRESSION:  Acute exacerbation of intermittent asthma, upper respiratory

tract infection, history of coronary artery bypass graft, history of

paroxysmal atrial fibrillation (chronic), coagulopathy due to Coumadin

therapy, hypertension, diabetes mellitus poorly controlled with

hyperglycemia, history of peripheral vascular disease, history of

cerebrovascular accident in the past, and history of cervical spine

surgery.



PLAN:  Continue IV antibiotics as well as bronchodilators and oxygen. 

Physical therapy and occupational therapy.  We will monitor PT/INR and

appropriately give Coumadin (this is for atrial fibrillation and

cerebrovascular accident).  The patient will be discharged home once

clinically stable.





__________________________________________

Bismark Sanchez MD





DD:  04/03/2018 7:56:06

DT:  04/03/2018 8:00:31

Job # [id number]

## 2018-04-04 NOTE — CP.PCM.CON
History of Present Illness





- History of Present Illness


History of Present Illness: 





i WAS ASKED TO SEE THE PATIENT BY DR PABLO DUE TO LEG EDEMA AND TO EVALUATE 

FOR POSSIBLE CHF.  HE WAS RECENTLY ADMITTED TO  FOR ASTHMA WITH AN INFECTION 

AND RECEIVED IV ANTIBIOTICS.  HE IS NOW IN TCU AND IS STILL RECEIVING 

ANTIBIOTICS.  HE ALSO HAS A HISTORY OF CAD WITH AN MI AND CABGS 6 YEARS AGO, 

HYPERTENSION, DIABETES MELLITUS, A RENAL TRANSPLANT AND AN OLD CVA.  HE DENIES 

CHEST PAIN OR A KNOWN HISTORY OF CHF.  HE STATES HIS BREATHING IS MUCH IMPROVED 

NOW COMPARED TO WHEN HE WAS ADMITTED TO THE HOSPITAL.  THE PATIENT STATES THAT 

HE HAD BILATERAL VARICOSE  VEIN STRIPPING IN ADDITIONAL TO CABGS VEIN 

HARVESTING AND THAT HIS HIS LEGS ARE OFTEN SWOLLEN.  THE PATIENT ALSO STATES 

THAT HIS LOCAL CARDIOLOGIST DOES REGULAR STRESS TESTS AND ECHOCARDIOGRAMS AND 

THAT HIS LAST STRESS TEST WAS IN OCTOBER 2017 AND THE RESULTS WERE GOOD.





Past Patient History





- Infectious Disease


Hx of Infectious Diseases: None





- Past Medical History & Family History


Past Medical History?: Yes





- Past Social History


Smoking Status: Never Smoked





- CARDIAC


Hx Cardiac Disorders: Yes


Hx Congestive Heart Failure: Yes


Hx Hypercholesterolemia: Yes


Hx Hypertension: Yes





- PULMONARY


Hx Asthma: Yes


Hx Pneumonia: Yes





- NEUROLOGICAL


HX Cerebrovascular Accident: Yes





- HEENT


Hx Blind: Yes (left eye)


Hx Cataracts: Yes





- RENAL


Hx Chronic Kidney Disease: Yes


Other/Comment: history of kidney transplant.  has left arm av shunt





- ENDOCRINE/METABOLIC


Hx Diabetes Mellitus Type 2: Yes





- HEMATOLOGICAL/ONCOLOGICAL


Hx AIDS: No


Hx Anemia: Yes


Hx Blood Transfusions: Yes


Hx Blood Transfusion Reaction: No


Hx Human Immunodeficiency Virus (HIV): No





- INTEGUMENTARY


Hx Dermatological Problems: No





- MUSCULOSKELETAL/RHEUMATOLOGICAL


Hx Falls: Yes


Hx Fractures: Yes





- GASTROINTESTINAL


Hx Diarrhea: Yes





- GENITOURINARY/GYNECOLOGICAL


Hx Genitourinary Disorders: No





- PSYCHIATRIC


Hx Anxiety: Yes


Hx Substance Use: No





- SURGICAL HISTORY


Hx Carotid Endarterectomy: Yes


Hx Cholecystectomy: Yes


Hx Coronary Artery Bypass Graft: Yes


Hx Coronary Stent: Yes





- ANESTHESIA


Hx Anesthesia: Yes


Hx Anesthesia Reactions: No


Hx Malignant Hyperthermia: No





Meds


Allergies/Adverse Reactions: 


 Allergies











Allergy/AdvReac Type Severity Reaction Status Date / Time


 


No Known Allergies Allergy   Verified 04/02/18 15:42














- Medications


Medications: 


 Current Medications





Acetaminophen (Tylenol 325mg Tab)  650 mg PO Q6 PRN


   PRN Reason:  temp 101


Acetylcysteine (Acetylcysteine 20%)  2 ml INH RBID [facility]


   Last Admin: 04/04/18 07:13 Dose:  2 ml


Albuterol/Ipratropium (Duoneb 3 Mg/0.5 Mg (3 Ml) Ud)  3 ml INH RQ4 [facility]


   Last Admin: 04/04/18 15:47 Dose:  3 ml


Alprazolam (Xanax)  0.5 mg PO HS PRN


   PRN Reason: Anxiety


Aspirin (Ecotrin)  81 mg PO DAILY [facility]


   Last Admin: 04/04/18 08:50 Dose:  81 mg


Atorvastatin Calcium (Lipitor)  20 mg PO HS [facility]


   Last Admin: 04/03/18 21:01 Dose:  20 mg


Cholecalciferol (Vitamin D)  2,000 intlu PO DAILY [facility]


   Last Admin: 04/04/18 08:49 Dose:  2,000 intlu


Diltiazem HCl (Cardizem Cd)  240 mg PO DAILY [facility]


   Last Admin: 04/04/18 08:50 Dose:  240 mg


Furosemide (Lasix)  40 mg PO DAILY [facility]


   Last Admin: 04/04/18 08:50 Dose:  40 mg


Home Med (Mycophenolate Sodium [Mycophenolic Acid])  540 mg PO Q12 [facility]


   Last Admin: 04/04/18 08:49 Dose:  540 mg


Home Med (Patient's Own Medication)  3 unit PO DAILY@0700 [facility]


   Last Admin: 04/04/18 06:49 Dose:  3 unit


Hydrocortisone (Anusol-Hc)  1 applic [place] BID [facility]


   Last Admin: 04/04/18 16:45 Dose:  1 applic


Ceftriaxone Sodium 1 gm/ (Sodium Chloride)  100 mls @ 100 mls/hr IVPB DAILY@

1700 [facility]


   Last Admin: 04/04/18 16:50 Dose:  100 mls/hr


Azithromycin 500 mg/ Sodium (Chloride)  250 mls @ 250 mls/hr IVPB DAILY@2100 [facility]


   Last Admin: 04/03/18 21:40 Dose:  250 mls/hr


Insulin Human Lispro (Humalog)  0 units SC ACHS [facility]


   PRN Reason: Protocol


   Last Admin: 04/04/18 16:45 Dose:  3 u


Insulin Lispro Protam/Lispro Human (Humalog Mix 75/25)  20 units SC DIN [facility]


   Last Admin: 04/04/18 16:46 Dose:  20 units


Insulin Lispro Protam/Lispro Human (Humalog Mix 75/25)  30 units SC BRK [facility]


   Last Admin: 04/04/18 08:51 Dose:  30 units


Methylprednisolone (Solu-Medrol)  40 mg IVP Q8H [facility]


   Last Admin: 04/04/18 12:46 Dose:  40 mg


Metoprolol Tartrate (Lopressor)  100 mg PO Q12 [facility]


   Last Admin: 04/04/18 08:50 Dose:  100 mg


Multivitamins/Minerals (Therapeutic-M Tab)  1 tab PO DAILY [facility]


   Last Admin: 04/04/18 08:50 Dose:  1 tab


Pantoprazole Sodium (Protonix Ec Tab)  40 mg PO ACL [facility]


   Last Admin: 04/04/18 11:38 Dose:  40 mg


Prednisone (Prednisone Tab)  5 mg PO DAILY [facility]


   Last Admin: 04/04/18 08:50 Dose:  5 mg


Pregabalin (Lyrica)  100 mg PO HS [facility]


   Last Admin: 04/03/18 21:01 Dose:  100 mg


Promethazine HCl/Codeine (Phenergan/Codeine Oral Syrup)  10 ml PO Q4 [facility]


   Last Admin: 04/04/18 13:52 Dose:  Not Given


Tramadol HCl (Ultram)  50 mg PO DAILY PRN


   PRN Reason: Pain, severe (8-10)


Trimethoprim/Sulfamethoxazole (Bactrim Ds Tab)  1 tab PO MWF [facility]


   PRN Reason: Protocol


   Last Admin: 04/04/18 08:58 Dose:  1 tab











Physical Exam





- Respiratory Exam


Respiratory Exam: Rhonchi





- Cardiovascular Exam


Cardiovascular Exam: REGULAR RHYTHM, +S1, +S2





- Extremities Exam


Additional comments: 





BILATERAL ~ 2+ PRETIBIAL EDEMA





RLE VEIN HARVESTING SCAR SEEN





- Additional Findings


Additional findings: 





EKG NSR, LVE





CXR SMALL BILATERAL PLEURAL EFFUSIONS


NO PULMONARY CONGESTION





Results





- Vital Signs


Recent Vital Signs: 


 Last Vital Signs











Temp  98.2 F   04/04/18 16:20


 


Pulse  69   04/04/18 16:20


 


Resp  20   04/04/18 16:20


 


BP  131/73   04/04/18 16:20


 


Pulse Ox  91 L  04/04/18 16:20














- Labs


Result Diagrams: 


 04/05/18 06:20





 04/05/18 06:20


Labs: 


 Laboratory Results - last 24 hr











  04/03/18 04/04/18 04/04/18





  20:37 05:52 06:00


 


WBC    9.8


 


RBC    5.28


 


Hgb    14.8


 


Hct    45.5


 


MCV    86.2


 


MCH    28.1


 


MCHC    32.6 L


 


RDW    15.9 H


 


Plt Count    199


 


PT   


 


INR   


 


Sodium   


 


Potassium   


 


Chloride   


 


Carbon Dioxide   


 


Anion Gap   


 


BUN   


 


Creatinine   


 


Est GFR ( Amer)   


 


Est GFR (Non-Af Amer)   


 


POC Glucose (mg/dL)  305 H  342 H 


 


Random Glucose   


 


Calcium   














  04/04/18 04/04/18 04/04/18





  06:00 06:00 11:20


 


WBC   


 


RBC   


 


Hgb   


 


Hct   


 


MCV   


 


MCH   


 


MCHC   


 


RDW   


 


Plt Count   


 


PT   30.3 H 


 


INR   2.7 H 


 


Sodium  134  


 


Potassium  4.9  


 


Chloride  96 L  


 


Carbon Dioxide  27  


 


Anion Gap  16  


 


BUN  40 H  


 


Creatinine  1.2  


 


Est GFR ( Amer)  > 60  


 


Est GFR (Non-Af Amer)  > 60  


 


POC Glucose (mg/dL)    437 H*


 


Random Glucose  334 H  


 


Calcium  9.7  














  04/04/18





  15:51


 


WBC 


 


RBC 


 


Hgb 


 


Hct 


 


MCV 


 


MCH 


 


MCHC 


 


RDW 


 


Plt Count 


 


PT 


 


INR 


 


Sodium 


 


Potassium 


 


Chloride 


 


Carbon Dioxide 


 


Anion Gap 


 


BUN 


 


Creatinine 


 


Est GFR ( Amer) 


 


Est GFR (Non-Af Amer) 


 


POC Glucose (mg/dL)  245 H


 


Random Glucose 


 


Calcium 














Assessment & Plan





- Assessment and Plan (Free Text)


Assessment: 





ASTHMA





CAD WITH OLD MI AND CABGS





HYPERTENSION





DM





RENAL TRANSPLANT





OLD CVA





THE LEG EDEMA IS CHRONIC AND PROBABLY FROM COMBINED VARICOSE VEIN STRIPPING AND 

VEIN HARVESTING FOR CABGS-THE CXR DOES NOT SHOW PULMONARY CONGESTION


Plan: 





CONTINUE O2, ASPIRIN, ATORVASTATIN, DILTIAZEM, FUROSEMIDE, METOPROLOL, 

ANTIBIOTICS AND WARFARIN AS PER INR





AN ECHOCARDIOGRAM WAS ORDERED TO ASSESS THE LV FUNCTION





PATIENT DISCUSSED WITH DR PABLO

## 2018-04-04 NOTE — RAD
PROCEDURE:  CHEST RADIOGRAPH, 1 VIEW



HISTORY:

CHF



COMPARISON:

03/31/2018.



FINDINGS:



LUNGS:

There are low lung volumes. There is bibasilar airspace disease. 



PLEURA:

No pneumothorax. Small pleural effusions the 



CARDIOVASCULAR:

The cardiomediastinal silhouette is stable.  Status post CABG. 



OSSEOUS STRUCTURES:

No significant abnormalities.



VISUALIZED UPPER ABDOMEN:

Normal.



OTHER FINDINGS:

None. 



IMPRESSION:

Bilateral pleural effusions and bibasilar atelectasis.



Low lung volumes may be related to poor inspiratory effort.

## 2018-04-04 NOTE — CP.PCM.PN
Subjective





- Date & Time of Evaluation


Date of Evaluation: 04/04/18


Time of Evaluation: 09:37





- Subjective


Subjective: 





STILL COUGHING


UNABLE TO EXPECTORATE SPUTUM


HAD AN EPISODE OF DIARRHEA TODAY





Objective





- Vital Signs/Intake and Output


Vital Signs (last 24 hours): 


 











Temp Pulse Resp BP Pulse Ox


 


 97.2 F L  70   18   148/80   100 


 


 04/04/18 08:07  04/04/18 08:50  04/04/18 08:07  04/04/18 08:50  04/04/18 08:07











- Medications


Medications: 


 Current Medications





Acetaminophen (Tylenol 325mg Tab)  650 mg PO Q6 PRN


   PRN Reason:  temp 101


Acetylcysteine (Acetylcysteine 20%)  2 ml INH RBID [facility]


   Last Admin: 04/04/18 07:13 Dose:  2 ml


Albuterol/Ipratropium (Duoneb 3 Mg/0.5 Mg (3 Ml) Ud)  3 ml INH RQ4 [facility]


   Last Admin: 04/04/18 07:13 Dose:  3 ml


Alprazolam (Xanax)  0.5 mg PO HS PRN


   PRN Reason: Anxiety


Aspirin (Ecotrin)  81 mg PO DAILY [facility]


   Last Admin: 04/04/18 08:50 Dose:  81 mg


Atorvastatin Calcium (Lipitor)  20 mg PO HS [facility]


   Last Admin: 04/03/18 21:01 Dose:  20 mg


Cholecalciferol (Vitamin D)  2,000 intlu PO DAILY [facility]


   Last Admin: 04/04/18 08:49 Dose:  2,000 intlu


Diltiazem HCl (Cardizem Cd)  240 mg PO DAILY [facility]


   Last Admin: 04/04/18 08:50 Dose:  240 mg


Furosemide (Lasix)  40 mg PO DAILY [facility]


   Last Admin: 04/04/18 08:50 Dose:  40 mg


Home Med (Mycophenolate Sodium [Mycophenolic Acid])  540 mg PO Q12 [facility]


   Last Admin: 04/04/18 08:49 Dose:  540 mg


Home Med (Patient's Own Medication)  3 unit PO DAILY@0700 [facility]


   Last Admin: 04/04/18 06:49 Dose:  3 unit


Hydrocortisone (Anusol-Hc)  1 applic [place] BID [facility]


   Last Admin: 04/04/18 08:52 Dose:  1 applic


Ceftriaxone Sodium 1 gm/ (Sodium Chloride)  100 mls @ 100 mls/hr IVPB DAILY@

1700 [facility]


   Last Admin: 04/03/18 17:12 Dose:  100 mls/hr


Azithromycin 500 mg/ Sodium (Chloride)  250 mls @ 250 mls/hr IVPB DAILY@2100 [facility]


   Last Admin: 04/03/18 21:40 Dose:  250 mls/hr


Insulin Human Lispro (Humalog)  0 units SC ACHS [facility]


   PRN Reason: Protocol


   Last Admin: 04/04/18 06:48 Dose:  6 u


Insulin Lispro Protam/Lispro Human (Humalog Mix 75/25)  20 units SC DIN [facility]


   Last Admin: 04/03/18 17:17 Dose:  20 units


Insulin Lispro Protam/Lispro Human (Humalog Mix 75/25)  30 units SC BRK [facility]


   Last Admin: 04/04/18 08:51 Dose:  30 units


Loperamide HCl (Imodium)  4 mg PO ONCE ONE


   Stop: 04/04/18 09:34


Methylprednisolone (Solu-Medrol)  40 mg IVP Q8H [facility]


   Last Admin: 04/04/18 04:29 Dose:  40 mg


Metoprolol Tartrate (Lopressor)  100 mg PO Q12 [facility]


   Last Admin: 04/04/18 08:50 Dose:  100 mg


Multivitamins/Minerals (Therapeutic-M Tab)  1 tab PO DAILY [facility]


   Last Admin: 04/04/18 08:50 Dose:  1 tab


Pantoprazole Sodium (Protonix Ec Tab)  40 mg PO ACL [facility]


   Last Admin: 04/03/18 11:44 Dose:  40 mg


Prednisone (Prednisone Tab)  5 mg PO DAILY [facility]


   Last Admin: 04/04/18 08:50 Dose:  5 mg


Pregabalin (Lyrica)  100 mg PO HS [facility]


   Last Admin: 04/03/18 21:01 Dose:  100 mg


Promethazine HCl/Codeine (Phenergan/Codeine Oral Syrup)  10 ml PO Q4 [facility]


   Last Admin: 04/04/18 04:55 Dose:  10 ml


Tramadol HCl (Ultram)  50 mg PO DAILY PRN


   PRN Reason: Pain, severe (8-10)


Trimethoprim/Sulfamethoxazole (Bactrim Ds Tab)  1 tab PO MWF [facility]


   PRN Reason: Protocol


   Last Admin: 04/04/18 08:58 Dose:  1 tab


Warfarin Sodium (Coumadin)  2 mg PO QD5 [facility]


   PRN Reason: Protocol


   Stop: 04/04/18 17:01











- Labs


Labs: 


 





 04/04/18 06:00 





 04/04/18 06:00 





 











PT  30.3 Seconds (9.8-13.1)  H  04/04/18  06:00    


 


INR  2.7  (0.9-1.2)  H  04/04/18  06:00    














- Constitutional


Appears: Chronically Ill





- Head Exam


Head Exam: ATRAUMATIC, NORMAL INSPECTION, NORMOCEPHALIC





- Eye Exam


Eye Exam: EOMI, Normal appearance, PERRL


Pupil Exam: NORMAL ACCOMODATION, PERRL





- ENT Exam


ENT Exam: Mucous Membranes Moist, Normal Exam





- Neck Exam


Neck Exam: Full ROM, Normal Inspection.  absent: Lymphadenopathy





- Respiratory Exam


Respiratory Exam: Decreased Breath Sounds, Prolonged Expiratory Phase, Rales, 

NORMAL BREATHING PATTERN





- Cardiovascular Exam


Cardiovascular Exam: REGULAR RHYTHM, +S1, +S2.  absent: Murmur





- GI/Abdominal Exam


GI & Abdominal Exam: Soft, Normal Bowel Sounds.  absent: Tenderness





- Rectal Exam


Rectal Exam: NORMAL INSPECTION





- Extremities Exam


Extremities Exam: Full ROM, Normal Capillary Refill, Normal Inspection, Pedal 

Edema.  absent: Joint Swelling





- Back Exam


Back Exam: NORMAL INSPECTION





- Neurological Exam


Neurological Exam: Alert, Awake, CN II-XII Intact, Normal Gait, Oriented x3





- Psychiatric Exam


Psychiatric exam: Normal Affect, Normal Mood





- Skin


Skin Exam: Dry, Intact, Normal Color, Warm





Assessment and Plan





- Assessment and Plan (Free Text)


Assessment: 





ACUTE ASTHMA


URI


PEDAL EDEMA


HX OF CABG


S/P RENAL TRANSPLANT


HTN


DM


Plan: 





WILL CONTINUE CURRENT RX


CARDIOLOGY EVAL

## 2018-04-05 LAB
BUN SERPL-MCNC: 41 MG/DL (ref 9–20)
CALCIUM SERPL-MCNC: 9.8 MG/DL (ref 8.4–10.2)
ERYTHROCYTE [DISTWIDTH] IN BLOOD BY AUTOMATED COUNT: 15.6 % (ref 11.5–14.5)
GFR NON-AFRICAN AMERICAN: > 60
HGB BLD-MCNC: 14.8 G/DL (ref 12–18)
INR PPP: 2.6 (ref 0.9–1.2)
MCH RBC QN AUTO: 28.2 PG (ref 27–31)
MCHC RBC AUTO-ENTMCNC: 32.6 G/DL (ref 33–37)
MCV RBC AUTO: 86.7 FL (ref 80–94)
PLATELET # BLD: 186 K/UL (ref 130–400)
PROTHROMBIN TIME: 29.6 SECONDS (ref 9.8–13.1)
RBC # BLD AUTO: 5.24 MIL/UL (ref 4.4–5.9)
WBC # BLD AUTO: 10 K/UL (ref 4.8–10.8)

## 2018-04-05 RX ADMIN — HYDROCORTISONE SCH APPLIC: 25 CREAM TOPICAL at 08:42

## 2018-04-05 RX ADMIN — PROMETHAZINE HYDROCHLORIDE AND CODEINE PHOSPHATE SCH ML: 6.25; 1 SOLUTION ORAL at 02:22

## 2018-04-05 RX ADMIN — VITAMIN D, TAB 1000IU (100/BT) SCH INTLU: 25 TAB at 08:44

## 2018-04-05 RX ADMIN — IPRATROPIUM BROMIDE AND ALBUTEROL SULFATE SCH ML: .5; 3 SOLUTION RESPIRATORY (INHALATION) at 23:10

## 2018-04-05 RX ADMIN — ACETYLCYSTEINE SCH ML: 200 INHALANT RESPIRATORY (INHALATION) at 07:25

## 2018-04-05 RX ADMIN — IPRATROPIUM BROMIDE AND ALBUTEROL SULFATE SCH ML: .5; 3 SOLUTION RESPIRATORY (INHALATION) at 15:29

## 2018-04-05 RX ADMIN — INSULIN LISPRO SCH U: 100 INJECTION, SOLUTION INTRAVENOUS; SUBCUTANEOUS at 17:02

## 2018-04-05 RX ADMIN — IPRATROPIUM BROMIDE AND ALBUTEROL SULFATE SCH ML: .5; 3 SOLUTION RESPIRATORY (INHALATION) at 07:25

## 2018-04-05 RX ADMIN — PROMETHAZINE HYDROCHLORIDE AND CODEINE PHOSPHATE SCH: 6.25; 1 SOLUTION ORAL at 05:56

## 2018-04-05 RX ADMIN — INSULIN LISPRO SCH UNITS: 100 INJECTION, SUSPENSION SUBCUTANEOUS at 17:02

## 2018-04-05 RX ADMIN — DILTIAZEM HYDROCHLORIDE SCH MG: 240 CAPSULE, COATED, EXTENDED RELEASE ORAL at 08:42

## 2018-04-05 RX ADMIN — INSULIN LISPRO SCH U: 100 INJECTION, SOLUTION INTRAVENOUS; SUBCUTANEOUS at 08:18

## 2018-04-05 RX ADMIN — PANTOPRAZOLE SODIUM SCH MG: 40 TABLET, DELAYED RELEASE ORAL at 11:15

## 2018-04-05 RX ADMIN — IPRATROPIUM BROMIDE AND ALBUTEROL SULFATE SCH ML: .5; 3 SOLUTION RESPIRATORY (INHALATION) at 11:15

## 2018-04-05 RX ADMIN — IPRATROPIUM BROMIDE AND ALBUTEROL SULFATE SCH ML: .5; 3 SOLUTION RESPIRATORY (INHALATION) at 19:38

## 2018-04-05 RX ADMIN — Medication SCH TAB: at 08:44

## 2018-04-05 RX ADMIN — ACETYLCYSTEINE SCH ML: 200 INHALANT RESPIRATORY (INHALATION) at 19:38

## 2018-04-05 RX ADMIN — HYDROCORTISONE SCH APPLIC: 25 CREAM TOPICAL at 17:00

## 2018-04-05 RX ADMIN — INSULIN LISPRO SCH: 100 INJECTION, SOLUTION INTRAVENOUS; SUBCUTANEOUS at 22:48

## 2018-04-05 RX ADMIN — IPRATROPIUM BROMIDE AND ALBUTEROL SULFATE SCH ML: .5; 3 SOLUTION RESPIRATORY (INHALATION) at 03:57

## 2018-04-05 RX ADMIN — PROMETHAZINE HYDROCHLORIDE AND CODEINE PHOSPHATE SCH ML: 6.25; 1 SOLUTION ORAL at 12:43

## 2018-04-05 RX ADMIN — PROMETHAZINE HYDROCHLORIDE AND CODEINE PHOSPHATE SCH ML: 6.25; 1 SOLUTION ORAL at 17:01

## 2018-04-05 RX ADMIN — PROMETHAZINE HYDROCHLORIDE AND CODEINE PHOSPHATE SCH ML: 6.25; 1 SOLUTION ORAL at 09:12

## 2018-04-05 RX ADMIN — PROMETHAZINE HYDROCHLORIDE AND CODEINE PHOSPHATE SCH ML: 6.25; 1 SOLUTION ORAL at 21:49

## 2018-04-05 RX ADMIN — INSULIN LISPRO SCH U: 100 INJECTION, SOLUTION INTRAVENOUS; SUBCUTANEOUS at 11:14

## 2018-04-05 RX ADMIN — INSULIN LISPRO SCH UNITS: 100 INJECTION, SUSPENSION SUBCUTANEOUS at 08:43

## 2018-04-05 RX ADMIN — METHYLPREDNISOLONE SODIUM SUCCINATE SCH MG: 40 INJECTION, POWDER, FOR SOLUTION INTRAMUSCULAR; INTRAVENOUS at 05:49

## 2018-04-05 NOTE — CP.PCM.PN
Subjective





- Date & Time of Evaluation


Date of Evaluation: 04/05/18


Time of Evaluation: 08:18





- Subjective


Subjective: 





SOB IMPROVING


STILL COUGHING


NO CHEST PAIN


EXERCISE TOLERANCE IMPROVING





Objective





- Vital Signs/Intake and Output


Vital Signs (last 24 hours): 


 











Temp Pulse Resp BP Pulse Ox


 


 97.5 F L  67   20   126/67   95 


 


 04/04/18 20:23  04/04/18 21:47  04/04/18 20:23  04/04/18 21:47  04/04/18 20:23











- Medications


Medications: 


 Current Medications





Acetaminophen (Tylenol 325mg Tab)  650 mg PO Q6 PRN


   PRN Reason:  temp 101


Acetylcysteine (Acetylcysteine 20%)  2 ml INH RBID [facility]


   Last Admin: 04/05/18 07:25 Dose:  2 ml


Albuterol/Ipratropium (Duoneb 3 Mg/0.5 Mg (3 Ml) Ud)  3 ml INH RQ4 [facility]


   Last Admin: 04/05/18 07:25 Dose:  3 ml


Alprazolam (Xanax)  0.5 mg PO HS PRN


   PRN Reason: Anxiety


Aspirin (Ecotrin)  81 mg PO DAILY [facility]


   Last Admin: 04/04/18 08:50 Dose:  81 mg


Atorvastatin Calcium (Lipitor)  20 mg PO HS [facility]


   Last Admin: 04/04/18 21:48 Dose:  20 mg


Cholecalciferol (Vitamin D)  2,000 intlu PO DAILY [facility]


   Last Admin: 04/04/18 08:49 Dose:  2,000 intlu


Diltiazem HCl (Cardizem Cd)  240 mg PO DAILY [facility]


   Last Admin: 04/04/18 08:50 Dose:  240 mg


Furosemide (Lasix)  40 mg PO DAILY [facility]


   Last Admin: 04/04/18 08:50 Dose:  40 mg


Home Med (Mycophenolate Sodium [Mycophenolic Acid])  540 mg PO Q12 [facility]


   Last Admin: 04/04/18 20:22 Dose:  540 mg


Home Med (Patient's Own Medication)  3 unit PO DAILY@0700 [facility]


   Last Admin: 04/05/18 08:01 Dose:  3 unit


Hydrocortisone (Anusol-Hc)  1 applic [place] BID [facility]


   Last Admin: 04/04/18 16:45 Dose:  1 applic


Insulin Human Lispro (Humalog)  0 units SC ACHS [facility]


   PRN Reason: Protocol


   Last Admin: 04/04/18 21:54 Dose:  Not Given


Insulin Lispro Protam/Lispro Human (Humalog Mix 75/25)  20 units SC DIN [facility]


   Last Admin: 04/04/18 16:46 Dose:  20 units


Insulin Lispro Protam/Lispro Human (Humalog Mix 75/25)  30 units SC BRK [facility]


   Last Admin: 04/04/18 08:51 Dose:  30 units


Metoprolol Tartrate (Lopressor)  100 mg PO Q12 [facility]


   Last Admin: 04/04/18 21:47 Dose:  100 mg


Multivitamins/Minerals (Therapeutic-M Tab)  1 tab PO DAILY [facility]


   Last Admin: 04/04/18 08:50 Dose:  1 tab


Pantoprazole Sodium (Protonix Ec Tab)  40 mg PO ACL [facility]


   Last Admin: 04/04/18 11:38 Dose:  40 mg


Prednisone (Prednisone Tab)  5 mg PO DAILY [facility]


   Last Admin: 04/04/18 08:50 Dose:  5 mg


Pregabalin (Lyrica)  100 mg PO HS [facility]


   Last Admin: 04/04/18 21:53 Dose:  100 mg


Promethazine HCl/Codeine (Phenergan/Codeine Oral Syrup)  10 ml PO Q4 [facility]


   Last Admin: 04/05/18 05:56 Dose:  Not Given


Tramadol HCl (Ultram)  50 mg PO DAILY PRN


   PRN Reason: Pain, severe (8-10)


Trimethoprim/Sulfamethoxazole (Bactrim Ds Tab)  1 tab PO MWF [facility]


   PRN Reason: Protocol


   Last Admin: 04/04/18 08:58 Dose:  1 tab


Warfarin Sodium (Coumadin)  2 mg PO QD5 [facility]


   PRN Reason: Protocol


   Stop: 04/05/18 17:01











- Labs


Labs: 


 





 04/05/18 06:20 





 04/05/18 06:20 





 











PT  29.6 Seconds (9.8-13.1)  H  04/05/18  06:20    


 


INR  2.6  (0.9-1.2)  H  04/05/18  06:20    














- Constitutional


Appears: No Acute Distress





- Head Exam


Head Exam: ATRAUMATIC, NORMAL INSPECTION, NORMOCEPHALIC





- Eye Exam


Eye Exam: EOMI, Normal appearance, PERRL


Pupil Exam: NORMAL ACCOMODATION, PERRL





- ENT Exam


ENT Exam: Mucous Membranes Moist, Normal Exam





- Neck Exam


Neck Exam: Full ROM, Normal Inspection.  absent: Lymphadenopathy





- Respiratory Exam


Respiratory Exam: Decreased Breath Sounds, Rales, Wheezes, NORMAL BREATHING 

PATTERN





- Cardiovascular Exam


Cardiovascular Exam: REGULAR RHYTHM, +S1, +S2.  absent: Murmur





- GI/Abdominal Exam


GI & Abdominal Exam: Soft, Normal Bowel Sounds.  absent: Tenderness





- Rectal Exam


Rectal Exam: NORMAL INSPECTION





- Extremities Exam


Extremities Exam: Full ROM, Normal Capillary Refill, Normal Inspection, Pedal 

Edema.  absent: Joint Swelling





- Back Exam


Back Exam: NORMAL INSPECTION





- Neurological Exam


Neurological Exam: Alert, Awake, CN II-XII Intact, Normal Gait, Oriented x3





- Psychiatric Exam


Psychiatric exam: Normal Affect, Normal Mood





- Skin


Skin Exam: Dry, Intact, Normal Color, Warm





Assessment and Plan





- Assessment and Plan (Free Text)


Assessment: 





ACUTE ASTHMA


ASHD


S/P CABG


S/P RENAL TRANSPLANT


PLEURAL EFFUSIONS


URI


DM


HTN


Plan: 





CARDIOLOGY CONSULT APPRECIATED


WILL D/C IV ANTIBIOTICS AND STEROIDS


INCREASE ACTIVITY

## 2018-04-05 NOTE — CP.PCM.PN
Subjective





- Date & Time of Evaluation


Date of Evaluation: 04/05/18


Time of Evaluation: 08:30





- Subjective


Subjective: 





NO CHEST PAIN





BREATHING BETTER TODAY





Objective





- Vital Signs/Intake and Output


Vital Signs (last 24 hours): 


 











Temp Pulse Resp BP Pulse Ox


 


 97.1 F L  78   20   153/90 H  92 L


 


 04/05/18 08:18  04/05/18 09:06  04/05/18 08:18  04/05/18 08:43  04/05/18 09:06











- Medications


Medications: 


 Current Medications





Acetaminophen (Tylenol 325mg Tab)  650 mg PO Q6 PRN


   PRN Reason:  temp 101


Acetylcysteine (Acetylcysteine 20%)  2 ml INH RBID [facility]


   Last Admin: 04/05/18 07:25 Dose:  2 ml


Albuterol/Ipratropium (Duoneb 3 Mg/0.5 Mg (3 Ml) Ud)  3 ml INH RQ4 [facility]


   Last Admin: 04/05/18 07:25 Dose:  3 ml


Alprazolam (Xanax)  0.5 mg PO HS PRN


   PRN Reason: Anxiety


Aspirin (Ecotrin)  81 mg PO DAILY [facility]


   Last Admin: 04/05/18 08:42 Dose:  81 mg


Atorvastatin Calcium (Lipitor)  20 mg PO HS [facility]


   Last Admin: 04/04/18 21:48 Dose:  20 mg


Azithromycin (Zithromax)  250 mg PO DAILY [facility]


   PRN Reason: Protocol


   Stop: 04/08/18 09:01


   Last Admin: 04/05/18 10:23 Dose:  250 mg


Cholecalciferol (Vitamin D)  2,000 intlu PO DAILY [facility]


   Last Admin: 04/05/18 08:44 Dose:  2,000 intlu


Diltiazem HCl (Cardizem Cd)  240 mg PO DAILY [facility]


   Last Admin: 04/05/18 08:42 Dose:  240 mg


Furosemide (Lasix)  40 mg PO DAILY [facility]


   Last Admin: 04/05/18 08:43 Dose:  40 mg


Home Med (Mycophenolate Sodium [Mycophenolic Acid])  540 mg PO Q12 [facility]


   Last Admin: 04/05/18 08:44 Dose:  540 mg


Home Med (Patient's Own Medication)  3 unit PO DAILY@0700 [facility]


   Last Admin: 04/05/18 08:01 Dose:  3 unit


Hydrocortisone (Anusol-Hc)  1 applic [place] BID [facility]


   Last Admin: 04/05/18 08:42 Dose:  1 applic


Insulin Human Lispro (Humalog)  0 units SC ACHS [facility]


   PRN Reason: Protocol


   Last Admin: 04/05/18 08:18 Dose:  4 u


Insulin Lispro Protam/Lispro Human (Humalog Mix 75/25)  20 units SC DIN [facility]


   Last Admin: 04/04/18 16:46 Dose:  20 units


Insulin Lispro Protam/Lispro Human (Humalog Mix 75/25)  30 units SC BRK [facility]


   Last Admin: 04/05/18 08:43 Dose:  30 units


Metoprolol Tartrate (Lopressor)  100 mg PO Q12 [facility]


   Last Admin: 04/05/18 08:43 Dose:  100 mg


Multivitamins/Minerals (Therapeutic-M Tab)  1 tab PO DAILY [facility]


   Last Admin: 04/05/18 08:44 Dose:  1 tab


Pantoprazole Sodium (Protonix Ec Tab)  40 mg PO ACL [facility]


   Last Admin: 04/04/18 11:38 Dose:  40 mg


Prednisone (Prednisone Tab)  5 mg PO DAILY [facility]


   Last Admin: 04/05/18 08:44 Dose:  5 mg


Pregabalin (Lyrica)  100 mg PO HS [facility]


   Last Admin: 04/04/18 21:53 Dose:  100 mg


Promethazine HCl/Codeine (Phenergan/Codeine Oral Syrup)  10 ml PO Q4 [facility]


   Last Admin: 04/05/18 09:12 Dose:  10 ml


Tramadol HCl (Ultram)  50 mg PO DAILY [facility]


   PRN Reason: Pain, severe (8-10)


Trimethoprim/Sulfamethoxazole (Bactrim Ds Tab)  1 tab PO MWF [facility]


   PRN Reason: Protocol


   Last Admin: 04/04/18 08:58 Dose:  1 tab


Warfarin Sodium (Coumadin)  2 mg PO QD5 [facility]


   PRN Reason: Protocol


   Stop: 04/05/18 17:01











- Labs


Labs: 


 





 04/05/18 06:20 





 04/05/18 06:20 





 











PT  29.6 Seconds (9.8-13.1)  H  04/05/18  06:20    


 


INR  2.6  (0.9-1.2)  H  04/05/18  06:20    














- Respiratory Exam


Respiratory Exam: Rhonchi





- Cardiovascular Exam


Cardiovascular Exam: REGULAR RHYTHM, +S1, +S2





- Extremities Exam


Extremities Exam: Pedal Edema





Assessment and Plan





- Assessment and Plan (Free Text)


Assessment: 





ASTHMA





CAD WITH CABGS





HYPERTENSION





HYPERLIPIDEMIA





DM


Plan: 





THE PATIENT WILL HAVE AN ECHOCARDIOGRAM TODAY





CONTINUE ASPIRIN, METOPROLOL, CARDIZEM, ATORVASTATIN, FUROSEMIDE

## 2018-04-06 LAB
BUN SERPL-MCNC: 43 MG/DL (ref 9–20)
CALCIUM SERPL-MCNC: 9.9 MG/DL (ref 8.4–10.2)
GFR NON-AFRICAN AMERICAN: > 60
INR PPP: 2.5 (ref 0.9–1.2)
PROTHROMBIN TIME: 27.7 SECONDS (ref 9.8–13.1)

## 2018-04-06 RX ADMIN — PROMETHAZINE HYDROCHLORIDE AND CODEINE PHOSPHATE SCH: 6.25; 1 SOLUTION ORAL at 12:31

## 2018-04-06 RX ADMIN — INSULIN LISPRO SCH U: 100 INJECTION, SOLUTION INTRAVENOUS; SUBCUTANEOUS at 11:51

## 2018-04-06 RX ADMIN — IPRATROPIUM BROMIDE AND ALBUTEROL SULFATE SCH: .5; 3 SOLUTION RESPIRATORY (INHALATION) at 11:36

## 2018-04-06 RX ADMIN — IPRATROPIUM BROMIDE AND ALBUTEROL SULFATE SCH ML: .5; 3 SOLUTION RESPIRATORY (INHALATION) at 02:31

## 2018-04-06 RX ADMIN — ACETYLCYSTEINE SCH ML: 200 INHALANT RESPIRATORY (INHALATION) at 19:57

## 2018-04-06 RX ADMIN — PANTOPRAZOLE SODIUM SCH MG: 40 TABLET, DELAYED RELEASE ORAL at 11:55

## 2018-04-06 RX ADMIN — PROMETHAZINE HYDROCHLORIDE AND CODEINE PHOSPHATE SCH ML: 6.25; 1 SOLUTION ORAL at 12:26

## 2018-04-06 RX ADMIN — INSULIN LISPRO SCH UNITS: 100 INJECTION, SUSPENSION SUBCUTANEOUS at 08:17

## 2018-04-06 RX ADMIN — DILTIAZEM HYDROCHLORIDE SCH MG: 240 CAPSULE, COATED, EXTENDED RELEASE ORAL at 08:16

## 2018-04-06 RX ADMIN — IPRATROPIUM BROMIDE AND ALBUTEROL SULFATE SCH ML: .5; 3 SOLUTION RESPIRATORY (INHALATION) at 07:29

## 2018-04-06 RX ADMIN — PROMETHAZINE HYDROCHLORIDE AND CODEINE PHOSPHATE SCH ML: 6.25; 1 SOLUTION ORAL at 22:00

## 2018-04-06 RX ADMIN — INSULIN LISPRO SCH: 100 INJECTION, SOLUTION INTRAVENOUS; SUBCUTANEOUS at 22:00

## 2018-04-06 RX ADMIN — IPRATROPIUM BROMIDE AND ALBUTEROL SULFATE SCH ML: .5; 3 SOLUTION RESPIRATORY (INHALATION) at 19:54

## 2018-04-06 RX ADMIN — SULFAMETHOXAZOLE AND TRIMETHOPRIM SCH TAB: 800; 160 TABLET ORAL at 08:16

## 2018-04-06 RX ADMIN — Medication SCH TAB: at 08:18

## 2018-04-06 RX ADMIN — PROMETHAZINE HYDROCHLORIDE AND CODEINE PHOSPHATE SCH: 6.25; 1 SOLUTION ORAL at 08:05

## 2018-04-06 RX ADMIN — HYDROCORTISONE SCH APPLIC: 25 CREAM TOPICAL at 16:54

## 2018-04-06 RX ADMIN — HYDROCORTISONE SCH APPLIC: 25 CREAM TOPICAL at 08:16

## 2018-04-06 RX ADMIN — INSULIN LISPRO SCH UNITS: 100 INJECTION, SUSPENSION SUBCUTANEOUS at 16:55

## 2018-04-06 RX ADMIN — INSULIN LISPRO SCH U: 100 INJECTION, SOLUTION INTRAVENOUS; SUBCUTANEOUS at 07:54

## 2018-04-06 RX ADMIN — IPRATROPIUM BROMIDE AND ALBUTEROL SULFATE SCH ML: .5; 3 SOLUTION RESPIRATORY (INHALATION) at 15:14

## 2018-04-06 RX ADMIN — PROMETHAZINE HYDROCHLORIDE AND CODEINE PHOSPHATE SCH ML: 6.25; 1 SOLUTION ORAL at 08:22

## 2018-04-06 RX ADMIN — INSULIN LISPRO SCH: 100 INJECTION, SOLUTION INTRAVENOUS; SUBCUTANEOUS at 16:55

## 2018-04-06 RX ADMIN — VITAMIN D, TAB 1000IU (100/BT) SCH INTLU: 25 TAB at 08:18

## 2018-04-06 RX ADMIN — ACETYLCYSTEINE SCH ML: 200 INHALANT RESPIRATORY (INHALATION) at 07:29

## 2018-04-06 RX ADMIN — PROMETHAZINE HYDROCHLORIDE AND CODEINE PHOSPHATE SCH ML: 6.25; 1 SOLUTION ORAL at 01:22

## 2018-04-06 RX ADMIN — PROMETHAZINE HYDROCHLORIDE AND CODEINE PHOSPHATE SCH ML: 6.25; 1 SOLUTION ORAL at 16:55

## 2018-04-06 NOTE — CP.PCM.PN
Subjective





- Date & Time of Evaluation


Date of Evaluation: 04/06/18


Time of Evaluation: 14:04





- Subjective


Subjective: 





cough less


sob improving


still has pedal edema





Objective





- Vital Signs/Intake and Output


Vital Signs (last 24 hours): 


 











Temp Pulse Resp BP Pulse Ox


 


 97.9 F   67   18   148/72   95 


 


 04/06/18 07:58  04/06/18 11:11  04/06/18 07:58  04/06/18 08:18  04/06/18 11:11











- Medications


Medications: 


 Current Medications





Acetaminophen (Tylenol 325mg Tab)  650 mg PO Q6 PRN


   PRN Reason:  temp 101


Acetylcysteine (Acetylcysteine 20%)  2 ml INH RBID [facility]


   Last Admin: 04/06/18 07:29 Dose:  2 ml


Albuterol/Ipratropium (Duoneb 3 Mg/0.5 Mg (3 Ml) Ud)  3 ml INH RQ4 [facility]


   Last Admin: 04/06/18 11:36 Dose:  Not Given


Alprazolam (Xanax)  0.5 mg PO HS PRN


   PRN Reason: Anxiety


Aspirin (Ecotrin)  81 mg PO DAILY [facility]


   Last Admin: 04/06/18 08:17 Dose:  81 mg


Atorvastatin Calcium (Lipitor)  20 mg PO HS [facility]


   Last Admin: 04/05/18 21:44 Dose:  20 mg


Azithromycin (Zithromax)  250 mg PO DAILY [facility]


   PRN Reason: Protocol


   Stop: 04/08/18 09:01


   Last Admin: 04/06/18 08:19 Dose:  250 mg


Cholecalciferol (Vitamin D)  2,000 intlu PO DAILY [facility]


   Last Admin: 04/06/18 08:18 Dose:  2,000 intlu


Diltiazem HCl (Cardizem Cd)  240 mg PO DAILY [facility]


   Last Admin: 04/06/18 08:16 Dose:  240 mg


Furosemide (Lasix)  40 mg PO DAILY [facility]


   Last Admin: 04/06/18 08:17 Dose:  40 mg


Home Med (Mycophenolate Sodium [Mycophenolic Acid])  540 mg PO Q12 [facility]


   Last Admin: 04/06/18 08:18 Dose:  540 mg


Home Med (Astagraf Xl)  2 mg PO DAILY@0700 [facility]


   Last Admin: 04/06/18 07:05 Dose:  2 mg


Hydrocortisone (Anusol-Hc)  1 applic [place] BID [facility]


   Last Admin: 04/06/18 08:16 Dose:  1 applic


Insulin Human Lispro (Humalog)  0 units SC ACHS [facility]


   PRN Reason: Protocol


   Last Admin: 04/06/18 11:51 Dose:  3 u


Insulin Lispro Protam/Lispro Human (Humalog Mix 75/25)  20 units SC DIN [facility]


   Last Admin: 04/05/18 17:02 Dose:  20 units


Insulin Lispro Protam/Lispro Human (Humalog Mix 75/25)  30 units SC BRK [facility]


   Last Admin: 04/06/18 08:17 Dose:  30 units


Metoprolol Tartrate (Lopressor)  100 mg PO Q12 [facility]


   Last Admin: 04/06/18 08:18 Dose:  100 mg


Multivitamins/Minerals (Therapeutic-M Tab)  1 tab PO DAILY [facility]


   Last Admin: 04/06/18 08:18 Dose:  1 tab


Pantoprazole Sodium (Protonix Ec Tab)  40 mg PO ACL [facility]


   Last Admin: 04/06/18 11:55 Dose:  40 mg


Prednisone (Prednisone Tab)  5 mg PO DAILY [facility]


   Last Admin: 04/06/18 08:18 Dose:  5 mg


Pregabalin (Lyrica)  100 mg PO HS [facility]


   Last Admin: 04/05/18 21:44 Dose:  100 mg


Promethazine HCl/Codeine (Phenergan/Codeine Oral Syrup)  10 ml PO Q4 [facility]


   Last Admin: 04/06/18 12:31 Dose:  Not Given


Tramadol HCl (Ultram)  50 mg PO DAILY [facility]


   PRN Reason: Pain, severe (8-10)


Trimethoprim/Sulfamethoxazole (Bactrim Ds Tab)  1 tab PO MWF [facility]


   PRN Reason: Protocol


   Last Admin: 04/06/18 08:16 Dose:  1 tab











- Labs


Labs: 


 





 04/05/18 06:20 





 04/06/18 06:05 





 











PT  27.7 Seconds (9.8-13.1)  H  04/06/18  06:05    


 


INR  2.5  (0.9-1.2)  H  04/06/18  06:05    














- Constitutional


Appears: No Acute Distress





- Head Exam


Head Exam: ATRAUMATIC, NORMAL INSPECTION, NORMOCEPHALIC





- Eye Exam


Eye Exam: EOMI, Normal appearance, PERRL


Pupil Exam: NORMAL ACCOMODATION, PERRL





- ENT Exam


ENT Exam: Mucous Membranes Moist, Normal Exam





- Neck Exam


Neck Exam: Full ROM, Normal Inspection.  absent: Lymphadenopathy





- Respiratory Exam


Respiratory Exam: Decreased Breath Sounds, Prolonged Expiratory Phase, Rales, 

NORMAL BREATHING PATTERN





- Cardiovascular Exam


Cardiovascular Exam: REGULAR RHYTHM, +S1, +S2.  absent: Murmur





- GI/Abdominal Exam


GI & Abdominal Exam: Soft, Normal Bowel Sounds.  absent: Tenderness





- Rectal Exam


Rectal Exam: NORMAL INSPECTION





- Extremities Exam


Extremities Exam: Full ROM, Normal Capillary Refill, Normal Inspection, Pedal 

Edema.  absent: Joint Swelling





- Back Exam


Back Exam: NORMAL INSPECTION





- Neurological Exam


Neurological Exam: Alert, Awake, CN II-XII Intact, Normal Gait, Oriented x3





- Psychiatric Exam


Psychiatric exam: Normal Affect, Normal Mood





- Skin


Skin Exam: Dry, Intact, Normal Color, Warm





Assessment and Plan





- Assessment and Plan (Free Text)


Assessment: 





acute asthma


coronary artery dz


dm


Plan: 





continue current rx

## 2018-04-06 NOTE — CP.PCM.PN
Subjective





- Date & Time of Evaluation


Date of Evaluation: 04/06/18


Time of Evaluation: 11:00





- Subjective


Subjective: 





BREATHING BETTER





NO CHEST PAIN





Objective





- Vital Signs/Intake and Output


Vital Signs (last 24 hours): 


 











Temp Pulse Resp BP Pulse Ox


 


 97.9 F   66   18   148/72   97 


 


 04/06/18 07:58  04/06/18 08:18  04/06/18 07:58  04/06/18 08:18  04/06/18 07:58











- Medications


Medications: 


 Current Medications





Acetaminophen (Tylenol 325mg Tab)  650 mg PO Q6 PRN


   PRN Reason:  temp 101


Acetylcysteine (Acetylcysteine 20%)  2 ml [facility]H [facility]


   Last Admin: 04/06/18 07:29 Dose:  2 ml


Albuterol/Ipratropium (Duoneb 3 Mg/0.5 Mg (3 Ml) Ud)  3 ml INH RQ4 [facility]


   Last Admin: 04/06/18 07:29 Dose:  3 ml


Alprazolam (Xanax)  0.5 mg PO HS PRN


   PRN Reason: Anxiety


Aspirin (Ecotrin)  81 mg PO DAILY [facility]


   Last Admin: 04/06/18 08:17 Dose:  81 mg


Atorvastatin Calcium (Lipitor)  20 mg PO HS [facility]


   Last Admin: 04/05/18 21:44 Dose:  20 mg


Azithromycin (Zithromax)  250 mg PO DAILY [facility]


   PRN Reason: Protocol


   Stop: 04/08/18 09:01


   Last Admin: 04/06/18 08:19 Dose:  250 mg


Cholecalciferol (Vitamin D)  2,000 intlu PO DAILY [facility]


   Last Admin: 04/06/18 08:18 Dose:  2,000 intlu


Diltiazem HCl (Cardizem Cd)  240 mg PO DAILY [facility]


   Last Admin: 04/06/18 08:16 Dose:  240 mg


Furosemide (Lasix)  40 mg PO DAILY [facility]


   Last Admin: 04/06/18 08:17 Dose:  40 mg


Home Med (Mycophenolate Sodium [Mycophenolic Acid])  540 mg PO Q12 [facility]


   Last Admin: 04/06/18 08:18 Dose:  540 mg


Home Med (Astagraf Xl)  2 mg PO DAILY@0700 [facility]


   Last Admin: 04/06/18 07:05 Dose:  2 mg


Hydrocortisone (Anusol-Hc)  1 applic [place] BID [facility]


   Last Admin: 04/06/18 08:16 Dose:  1 applic


Insulin Human Lispro (Humalog)  0 units SC ACHS [facility]


   PRN Reason: Protocol


   Last Admin: 04/06/18 07:54 Dose:  3 u


Insulin Lispro Protam/Lispro Human (Humalog Mix 75/25)  20 units SC DIN [facility]


   Last Admin: 04/05/18 17:02 Dose:  20 units


Insulin Lispro Protam/Lispro Human (Humalog Mix 75/25)  30 units SC BRK [facility]


   Last Admin: 04/06/18 08:17 Dose:  30 units


Metoprolol Tartrate (Lopressor)  100 mg PO Q12 [facility]


   Last Admin: 04/06/18 08:18 Dose:  100 mg


Multivitamins/Minerals (Therapeutic-M Tab)  1 tab PO DAILY [facility]


   Last Admin: 04/06/18 08:18 Dose:  1 tab


Pantoprazole Sodium (Protonix Ec Tab)  40 mg PO ACL [facility]


   Last Admin: 04/05/18 11:15 Dose:  40 mg


Prednisone (Prednisone Tab)  5 mg PO DAILY [facility]


   Last Admin: 04/06/18 08:18 Dose:  5 mg


Pregabalin (Lyrica)  100 mg PO HS [facility]


   Last Admin: 04/05/18 21:44 Dose:  100 mg


Promethazine HCl/Codeine (Phenergan/Codeine Oral Syrup)  10 ml PO Q4 [facility]


   Last Admin: 04/06/18 08:22 Dose:  10 ml


Tramadol HCl (Ultram)  50 mg PO DAILY [facility]


   PRN Reason: Pain, severe (8-10)


Trimethoprim/Sulfamethoxazole (Bactrim Ds Tab)  1 tab PO MWF [facility]


   PRN Reason: Protocol


   Last Admin: 04/06/18 08:16 Dose:  1 tab











- Labs


Labs: 


 





 04/05/18 06:20 





 04/06/18 06:05 





 











PT  27.7 Seconds (9.8-13.1)  H  04/06/18  06:05    


 


INR  2.5  (0.9-1.2)  H  04/06/18  06:05    














- Respiratory Exam


Respiratory Exam: Rhonchi, Wheezes





- Cardiovascular Exam


Cardiovascular Exam: REGULAR RHYTHM, +S1, +S2





- Extremities Exam


Additional comments: 





MILD LE LEG EDEMA





- Additional Findings


Additional findings: 





ECHO WITH GOOD LV FUNCTION IN MY OPINION, MILD AR











Assessment and Plan





- Assessment and Plan (Free Text)


Assessment: 





ASTHMA





CAD WITH CABGS, GOOD LV SYSTOLIC CONTRACTION





HYPERTENSION





HYPERLIPIDEMIA


Plan: 





CONTINUE ASPIRIN, CARDIZEM, METOPROLOL, ATORVASTATIN AND FUROSEMIDE

## 2018-04-07 LAB
INR PPP: 1.9 (ref 0.9–1.2)
PROTHROMBIN TIME: 21.4 SECONDS (ref 9.8–13.1)

## 2018-04-07 RX ADMIN — INSULIN LISPRO SCH U: 100 INJECTION, SOLUTION INTRAVENOUS; SUBCUTANEOUS at 11:45

## 2018-04-07 RX ADMIN — IPRATROPIUM BROMIDE AND ALBUTEROL SULFATE SCH ML: .5; 3 SOLUTION RESPIRATORY (INHALATION) at 07:57

## 2018-04-07 RX ADMIN — PROMETHAZINE HYDROCHLORIDE AND CODEINE PHOSPHATE PRN ML: 6.25; 1 SOLUTION ORAL at 21:04

## 2018-04-07 RX ADMIN — IPRATROPIUM BROMIDE AND ALBUTEROL SULFATE SCH ML: .5; 3 SOLUTION RESPIRATORY (INHALATION) at 23:28

## 2018-04-07 RX ADMIN — PROMETHAZINE HYDROCHLORIDE AND CODEINE PHOSPHATE SCH ML: 6.25; 1 SOLUTION ORAL at 05:18

## 2018-04-07 RX ADMIN — IPRATROPIUM BROMIDE AND ALBUTEROL SULFATE SCH ML: .5; 3 SOLUTION RESPIRATORY (INHALATION) at 04:57

## 2018-04-07 RX ADMIN — PROMETHAZINE HYDROCHLORIDE AND CODEINE PHOSPHATE SCH: 6.25; 1 SOLUTION ORAL at 08:46

## 2018-04-07 RX ADMIN — PANTOPRAZOLE SODIUM SCH MG: 40 TABLET, DELAYED RELEASE ORAL at 10:42

## 2018-04-07 RX ADMIN — INSULIN LISPRO SCH U: 100 INJECTION, SOLUTION INTRAVENOUS; SUBCUTANEOUS at 16:43

## 2018-04-07 RX ADMIN — DILTIAZEM HYDROCHLORIDE SCH MG: 240 CAPSULE, COATED, EXTENDED RELEASE ORAL at 08:43

## 2018-04-07 RX ADMIN — PROMETHAZINE HYDROCHLORIDE AND CODEINE PHOSPHATE SCH: 6.25; 1 SOLUTION ORAL at 01:30

## 2018-04-07 RX ADMIN — IPRATROPIUM BROMIDE AND ALBUTEROL SULFATE SCH ML: .5; 3 SOLUTION RESPIRATORY (INHALATION) at 15:15

## 2018-04-07 RX ADMIN — VITAMIN D, TAB 1000IU (100/BT) SCH INTLU: 25 TAB at 08:42

## 2018-04-07 RX ADMIN — IPRATROPIUM BROMIDE AND ALBUTEROL SULFATE SCH ML: .5; 3 SOLUTION RESPIRATORY (INHALATION) at 00:01

## 2018-04-07 RX ADMIN — Medication SCH TAB: at 08:43

## 2018-04-07 RX ADMIN — ACETYLCYSTEINE SCH ML: 200 INHALANT RESPIRATORY (INHALATION) at 07:57

## 2018-04-07 RX ADMIN — IPRATROPIUM BROMIDE AND ALBUTEROL SULFATE SCH ML: .5; 3 SOLUTION RESPIRATORY (INHALATION) at 11:02

## 2018-04-07 RX ADMIN — IPRATROPIUM BROMIDE AND ALBUTEROL SULFATE SCH ML: .5; 3 SOLUTION RESPIRATORY (INHALATION) at 19:12

## 2018-04-07 RX ADMIN — HYDROCORTISONE SCH: 25 CREAM TOPICAL at 08:46

## 2018-04-07 RX ADMIN — ACETYLCYSTEINE SCH: 200 INHALANT RESPIRATORY (INHALATION) at 19:12

## 2018-04-07 RX ADMIN — HYDROCORTISONE SCH: 25 CREAM TOPICAL at 16:41

## 2018-04-07 RX ADMIN — INSULIN LISPRO SCH UNITS: 100 INJECTION, SUSPENSION SUBCUTANEOUS at 16:42

## 2018-04-07 RX ADMIN — INSULIN LISPRO SCH: 100 INJECTION, SOLUTION INTRAVENOUS; SUBCUTANEOUS at 06:57

## 2018-04-07 RX ADMIN — INSULIN LISPRO SCH UNITS: 100 INJECTION, SUSPENSION SUBCUTANEOUS at 08:44

## 2018-04-07 RX ADMIN — PROMETHAZINE HYDROCHLORIDE AND CODEINE PHOSPHATE SCH ML: 6.25; 1 SOLUTION ORAL at 10:36

## 2018-04-07 NOTE — CP.PCM.PN
Subjective





- Date & Time of Evaluation


Date of Evaluation: 04/07/18


Time of Evaluation: 10:47





- Subjective


Subjective: 





COUGH CONTINUES TO IMPROVE


DIARRHEA LESS





Objective





- Vital Signs/Intake and Output


Vital Signs (last 24 hours): 


 











Temp Pulse Resp BP Pulse Ox


 


 98.2 F   98 H  20   116/90   99 


 


 04/06/18 20:57  04/07/18 08:43  04/07/18 08:22  04/07/18 08:43  04/07/18 08:22











- Medications


Medications: 


 Current Medications





Acetaminophen (Tylenol 325mg Tab)  650 mg PO Q6 PRN


   PRN Reason:  temp 101


Acetylcysteine (Acetylcysteine 20%)  2 ml INH RBID [facility]


   Last Admin: 04/07/18 07:57 Dose:  2 ml


Albuterol/Ipratropium (Duoneb 3 Mg/0.5 Mg (3 Ml) Ud)  3 ml INH RQ4 [facility]


   Last Admin: 04/07/18 07:57 Dose:  3 ml


Alprazolam (Xanax)  0.5 mg PO HS PRN


   PRN Reason: Anxiety


Aspirin (Ecotrin)  81 mg PO DAILY [facility]


   Last Admin: 04/07/18 08:43 Dose:  81 mg


Atorvastatin Calcium (Lipitor)  20 mg PO HS [facility]


   Last Admin: 04/06/18 22:05 Dose:  20 mg


Azithromycin (Zithromax)  250 mg PO DAILY [facility]


   PRN Reason: Protocol


   Stop: 04/08/18 09:01


   Last Admin: 04/07/18 08:42 Dose:  250 mg


Cholecalciferol (Vitamin D)  2,000 intlu PO DAILY [facility]


   Last Admin: 04/07/18 08:42 Dose:  2,000 intlu


Diltiazem HCl (Cardizem Cd)  240 mg PO DAILY [facility]


   Last Admin: 04/07/18 08:43 Dose:  240 mg


Furosemide (Lasix)  40 mg PO DAILY [facility]


   Last Admin: 04/07/18 08:42 Dose:  40 mg


Home Med (Mycophenolate Sodium [Mycophenolic Acid])  540 mg PO Q12 [facility]


   Stop: 04/07/18 23:00


   Last Admin: 04/07/18 08:41 Dose:  540 mg


Home Med (Astagraf Xl)  2 mg PO DAILY@0700 [facility]


   Last Admin: 04/07/18 06:58 Dose:  2 mg


Home Med (Mycophenolate Sodium [Mycophenolic Acid])  540 mg PO Q12 [facility]


Hydrocortisone (Anusol-Hc)  1 applic [place] BID [facility]


   Last Admin: 04/07/18 08:46 Dose:  Not Given


Insulin Human Lispro (Humalog)  0 units SC ACHS [facility]


   PRN Reason: Protocol


   Last Admin: 04/07/18 06:57 Dose:  Not Given


Insulin Lispro Protam/Lispro Human (Humalog Mix 75/25)  20 units SC DIN [facility]


   Last Admin: 04/06/18 16:55 Dose:  20 units


Insulin Lispro Protam/Lispro Human (Humalog Mix 75/25)  30 units SC BRK [facility]


   Last Admin: 04/07/18 08:44 Dose:  30 units


Metoprolol Tartrate (Lopressor)  100 mg PO Q12 [facility]


   Last Admin: 04/07/18 08:41 Dose:  100 mg


Multivitamins/Minerals (Therapeutic-M Tab)  1 tab PO DAILY [facility]


   Last Admin: 04/07/18 08:43 Dose:  1 tab


Pantoprazole Sodium (Protonix Ec Tab)  40 mg PO ACL [facility]


   Last Admin: 04/07/18 10:42 Dose:  40 mg


Prednisone (Prednisone Tab)  5 mg PO DAILY [facility]


   Last Admin: 04/07/18 08:45 Dose:  5 mg


Pregabalin (Lyrica)  100 mg PO HS [facility]


   Last Admin: 04/06/18 22:05 Dose:  100 mg


Promethazine HCl/Codeine (Phenergan/Codeine Oral Syrup)  10 ml PO Q4 [facility]


   PRN Reason: Cough


Tramadol HCl (Ultram)  50 mg PO DAILY [facility]


   PRN Reason: Pain, severe (8-10)


Trimethoprim/Sulfamethoxazole (Bactrim Ds Tab)  1 tab PO MWF [facility]


   PRN Reason: Protocol


   Last Admin: 04/06/18 08:16 Dose:  1 tab











- Labs


Labs: 


 





 04/05/18 06:20 





 04/06/18 06:05 





 











PT  21.4 Seconds (9.8-13.1)  H D 04/07/18  05:05    


 


INR  1.9  (0.9-1.2)  H D 04/07/18  05:05    














- Constitutional


Appears: No Acute Distress





- Head Exam


Head Exam: ATRAUMATIC, NORMAL INSPECTION, NORMOCEPHALIC





- Eye Exam


Eye Exam: EOMI, Normal appearance, PERRL


Pupil Exam: NORMAL ACCOMODATION, PERRL





- ENT Exam


ENT Exam: Mucous Membranes Moist, Normal Exam





- Neck Exam


Neck Exam: Full ROM, Normal Inspection.  absent: Lymphadenopathy





- Respiratory Exam


Respiratory Exam: Prolonged Expiratory Phase, Wheezes, NORMAL BREATHING PATTERN





- Cardiovascular Exam


Cardiovascular Exam: REGULAR RHYTHM, +S1, +S2.  absent: Murmur





- GI/Abdominal Exam


GI & Abdominal Exam: Soft, Normal Bowel Sounds.  absent: Tenderness





- Rectal Exam


Rectal Exam: NORMAL INSPECTION





- Extremities Exam


Extremities Exam: Full ROM, Normal Capillary Refill, Normal Inspection, Pedal 

Edema.  absent: Joint Swelling





- Back Exam


Back Exam: NORMAL INSPECTION





- Neurological Exam


Neurological Exam: Alert, Awake, CN II-XII Intact, Normal Gait, Oriented x3





- Psychiatric Exam


Psychiatric exam: Normal Affect, Normal Mood





- Skin


Skin Exam: Dry, Intact, Normal Color, Warm





Assessment and Plan





- Assessment and Plan (Free Text)


Assessment: 





ACUTE ASTHMA


URI


PEDAL EDEMA


DM


PAROXYSMAL A.FIB


Plan: 





CONTINUE CURRENT RX

## 2018-04-08 LAB
INR PPP: 1.6 (ref 0.9–1.2)
PROTHROMBIN TIME: 17.6 SECONDS (ref 9.8–13.1)

## 2018-04-08 RX ADMIN — IPRATROPIUM BROMIDE AND ALBUTEROL SULFATE SCH ML: .5; 3 SOLUTION RESPIRATORY (INHALATION) at 04:06

## 2018-04-08 RX ADMIN — IPRATROPIUM BROMIDE AND ALBUTEROL SULFATE SCH ML: .5; 3 SOLUTION RESPIRATORY (INHALATION) at 11:35

## 2018-04-08 RX ADMIN — INSULIN LISPRO SCH UNITS: 100 INJECTION, SUSPENSION SUBCUTANEOUS at 17:34

## 2018-04-08 RX ADMIN — INSULIN LISPRO SCH: 100 INJECTION, SOLUTION INTRAVENOUS; SUBCUTANEOUS at 17:00

## 2018-04-08 RX ADMIN — Medication SCH TAB: at 08:29

## 2018-04-08 RX ADMIN — DILTIAZEM HYDROCHLORIDE SCH MG: 240 CAPSULE, COATED, EXTENDED RELEASE ORAL at 08:33

## 2018-04-08 RX ADMIN — INSULIN LISPRO SCH U: 100 INJECTION, SOLUTION INTRAVENOUS; SUBCUTANEOUS at 07:23

## 2018-04-08 RX ADMIN — PANTOPRAZOLE SODIUM SCH MG: 40 TABLET, DELAYED RELEASE ORAL at 11:30

## 2018-04-08 RX ADMIN — IPRATROPIUM BROMIDE AND ALBUTEROL SULFATE SCH ML: .5; 3 SOLUTION RESPIRATORY (INHALATION) at 07:29

## 2018-04-08 RX ADMIN — INSULIN LISPRO SCH: 100 INJECTION, SOLUTION INTRAVENOUS; SUBCUTANEOUS at 03:44

## 2018-04-08 RX ADMIN — ACETYLCYSTEINE SCH ML: 200 INHALANT RESPIRATORY (INHALATION) at 07:28

## 2018-04-08 RX ADMIN — INSULIN LISPRO SCH UNITS: 100 INJECTION, SUSPENSION SUBCUTANEOUS at 08:35

## 2018-04-08 RX ADMIN — ACETYLCYSTEINE SCH ML: 100 SOLUTION ORAL; RESPIRATORY (INHALATION) at 11:34

## 2018-04-08 RX ADMIN — PROMETHAZINE HYDROCHLORIDE AND CODEINE PHOSPHATE PRN ML: 6.25; 1 SOLUTION ORAL at 21:55

## 2018-04-08 RX ADMIN — INSULIN LISPRO SCH: 100 INJECTION, SOLUTION INTRAVENOUS; SUBCUTANEOUS at 21:47

## 2018-04-08 RX ADMIN — IPRATROPIUM BROMIDE AND ALBUTEROL SULFATE SCH ML: .5; 3 SOLUTION RESPIRATORY (INHALATION) at 19:10

## 2018-04-08 RX ADMIN — INSULIN LISPRO SCH U: 100 INJECTION, SOLUTION INTRAVENOUS; SUBCUTANEOUS at 12:00

## 2018-04-08 RX ADMIN — VITAMIN D, TAB 1000IU (100/BT) SCH INTLU: 25 TAB at 08:31

## 2018-04-08 RX ADMIN — HYDROCORTISONE SCH: 25 CREAM TOPICAL at 08:29

## 2018-04-08 RX ADMIN — ACETYLCYSTEINE SCH ML: 100 SOLUTION ORAL; RESPIRATORY (INHALATION) at 19:10

## 2018-04-08 RX ADMIN — HYDROCORTISONE SCH: 25 CREAM TOPICAL at 17:31

## 2018-04-08 RX ADMIN — IPRATROPIUM BROMIDE AND ALBUTEROL SULFATE SCH ML: .5; 3 SOLUTION RESPIRATORY (INHALATION) at 15:09

## 2018-04-08 NOTE — CP.PCM.PN
Subjective





- Date & Time of Evaluation


Date of Evaluation: 04/08/18


Time of Evaluation: 10:30





- Subjective


Subjective: 





NO CHEST PAIN





BREATHING BETTER





Objective





- Vital Signs/Intake and Output


Vital Signs (last 24 hours): 


 











Temp Pulse Resp BP Pulse Ox


 


 97.9 F   91 H  20   146/64   94 L


 


 04/08/18 08:14  04/08/18 08:33  04/08/18 08:14  04/08/18 08:33  04/08/18 08:14











- Medications


Medications: 


 Current Medications





Acetaminophen (Tylenol 325mg Tab)  650 mg PO Q6 PRN


   PRN Reason:  temp 101


Acetylcysteine (Mucomyst 10% 4ml)  4 ml IH RBID [facility]


Albuterol/Ipratropium (Duoneb 3 Mg/0.5 Mg (3 Ml) Ud)  3 ml INH RQ4 [facility]


   Last Admin: 04/08/18 07:29 Dose:  3 ml


Alprazolam (Xanax)  0.5 mg PO HS PRN


   PRN Reason: Anxiety


Aspirin (Ecotrin)  81 mg PO DAILY [facility]


   Last Admin: 04/08/18 08:31 Dose:  81 mg


Atorvastatin Calcium (Lipitor)  20 mg PO HS [facility]


   Last Admin: 04/07/18 21:14 Dose:  20 mg


Cholecalciferol (Vitamin D)  2,000 intlu PO DAILY [facility]


   Last Admin: 04/08/18 08:31 Dose:  2,000 intlu


Diltiazem HCl (Cardizem Cd)  240 mg PO DAILY [facility]


   Last Admin: 04/08/18 08:33 Dose:  240 mg


Furosemide (Lasix)  40 mg PO DAILY [facility]


   Last Admin: 04/08/18 08:30 Dose:  40 mg


Home Med (Astagraf Xl)  2 mg PO DAILY@0700 [facility]


   Last Admin: 04/08/18 07:17 Dose:  2 mg


Home Med (Mycophenolate Sodium [Mycophenolic Acid])  540 mg PO Q12 [facility]


   Last Admin: 04/08/18 08:37 Dose:  540 mg


Hydrocortisone (Anusol-Hc)  1 applic [place] BID [facility]


   Last Admin: 04/08/18 08:29 Dose:  Not Given


Insulin Human Lispro (Humalog)  0 units SC ACHS [facility]


   PRN Reason: Protocol


   Last Admin: 04/08/18 07:23 Dose:  2 u


Insulin Lispro Protam/Lispro Human (Humalog Mix 75/25)  20 units SC DIN [facility]


   Last Admin: 04/07/18 16:42 Dose:  20 units


Insulin Lispro Protam/Lispro Human (Humalog Mix 75/25)  30 units SC BRK [facility]


   Last Admin: 04/08/18 08:35 Dose:  30 units


Loperamide HCl (Imodium)  2 mg PO BID [facility]


   PRN Reason: Diarrhea


Metoprolol Tartrate (Lopressor)  100 mg PO Q12 [facility]


   Last Admin: 04/08/18 08:31 Dose:  100 mg


Multivitamins/Minerals (Therapeutic-M Tab)  1 tab PO DAILY [facility]


   Last Admin: 04/08/18 08:29 Dose:  1 tab


Pantoprazole Sodium (Protonix Ec Tab)  40 mg PO ACL [facility]


   Last Admin: 04/07/18 10:42 Dose:  40 mg


Prednisone (Prednisone Tab)  5 mg PO DAILY [facility]


   Last Admin: 04/08/18 08:37 Dose:  5 mg


Pregabalin (Lyrica)  100 mg PO HS [facility]


   Last Admin: 04/07/18 21:13 Dose:  100 mg


Promethazine HCl/Codeine (Phenergan/Codeine Oral Syrup)  10 ml PO Q4 [facility]


   PRN Reason: Cough


   Last Admin: 04/07/18 21:04 Dose:  10 ml


Tramadol HCl (Ultram)  50 mg PO DAILY [facility]


   PRN Reason: Pain, severe (8-10)


   Last Admin: 04/08/18 07:16 Dose:  50 mg


Trimethoprim/Sulfamethoxazole (Bactrim Ds Tab)  1 tab PO MWF [facility]


   PRN Reason: Protocol


   Last Admin: 04/06/18 08:16 Dose:  1 tab











- Labs


Labs: 


 





 04/05/18 06:20 





 04/06/18 06:05 





 











PT  17.6 Seconds (9.8-13.1)  H  04/08/18  06:20    


 


INR  1.6  (0.9-1.2)  H  04/08/18  06:20    














- Respiratory Exam


Respiratory Exam: Wheezes





- Cardiovascular Exam


Cardiovascular Exam: REGULAR RHYTHM, +S1, +S2





- Extremities Exam


Additional comments: 





MILD BILAT LE EDEMA





Assessment and Plan





- Assessment and Plan (Free Text)


Assessment: 





ASTHMA





CAD-STABLE





HYPERTENSION





OLD CVA





LEG EDEMA FROM VEIN STRIPPING FOR VV AND VEIN HARVESTING FOR CABGS


Plan: 





CONTINUE RESPIRATORY CARE, FUROSEMIDE, METOPROLOL, DILTIAZEM, ATORVASTATIN, 

ASPIRIN





PATIENT DISCUSSED WITH DR PABLO

## 2018-04-08 NOTE — CP.PCM.PN
Subjective





- Date & Time of Evaluation


Date of Evaluation: 04/08/18


Time of Evaluation: 11:51





- Subjective


Subjective: 





cough continues to improve


sob less


no chest pains


less pedal edema





Objective





- Vital Signs/Intake and Output


Vital Signs (last 24 hours): 


 











Temp Pulse Resp BP Pulse Ox


 


 97.9 F   91 H  20   146/64   94 L


 


 04/08/18 08:14  04/08/18 08:33  04/08/18 08:14  04/08/18 08:33  04/08/18 08:14











- Medications


Medications: 


 Current Medications





Acetaminophen (Tylenol 325mg Tab)  650 mg PO Q6 PRN


   PRN Reason:  temp 101


Acetylcysteine (Mucomyst 10% 4ml)  4 ml IH RBID [facility]


   Last Admin: 04/08/18 11:34 Dose:  4 ml


Albuterol/Ipratropium (Duoneb 3 Mg/0.5 Mg (3 Ml) Ud)  3 ml INH RQ4 [facility]


   Last Admin: 04/08/18 11:35 Dose:  3 ml


Alprazolam (Xanax)  0.5 mg PO HS PRN


   PRN Reason: Anxiety


Aspirin (Ecotrin)  81 mg PO DAILY [facility]


   Last Admin: 04/08/18 08:31 Dose:  81 mg


Atorvastatin Calcium (Lipitor)  20 mg PO HS [facility]


   Last Admin: 04/07/18 21:14 Dose:  20 mg


Cholecalciferol (Vitamin D)  2,000 intlu PO DAILY [facility]


   Last Admin: 04/08/18 08:31 Dose:  2,000 intlu


Diltiazem HCl (Cardizem Cd)  240 mg PO DAILY [facility]


   Last Admin: 04/08/18 08:33 Dose:  240 mg


Furosemide (Lasix)  40 mg PO DAILY [facility]


   Last Admin: 04/08/18 08:30 Dose:  40 mg


Home Med (Astagraf Xl)  2 mg PO DAILY@0700 [facility]


   Last Admin: 04/08/18 07:17 Dose:  2 mg


Home Med (Mycophenolate Sodium [Mycophenolic Acid])  540 mg PO Q12 [facility]


   Last Admin: 04/08/18 08:37 Dose:  540 mg


Hydrocortisone (Anusol-Hc)  1 applic MO BID [facility]


   Last Admin: 04/08/18 08:29 Dose:  Not Given


Insulin Human Lispro (Humalog)  0 units SC ACHS [facility]


   PRN Reason: Protocol


   Last Admin: 04/08/18 07:23 Dose:  2 u


Insulin Lispro Protam/Lispro Human (Humalog Mix 75/25)  20 units SC DIN [facility]


   Last Admin: 04/07/18 16:42 Dose:  20 units


Insulin Lispro Protam/Lispro Human (Humalog Mix 75/25)  30 units SC BRK [facility]


   Last Admin: 04/08/18 08:35 Dose:  30 units


Loperamide HCl (Imodium)  2 mg PO BID PRN


   PRN Reason: Diarrhea


Metoprolol Tartrate (Lopressor)  100 mg PO Q12 [facility]


   Last Admin: 04/08/18 08:31 Dose:  100 mg


Multivitamins/Minerals (Therapeutic-M Tab)  1 tab PO DAILY [facility]


   Last Admin: 04/08/18 08:29 Dose:  1 tab


Pantoprazole Sodium (Protonix Ec Tab)  40 mg PO ACL [facility]


   Last Admin: 04/07/18 10:42 Dose:  40 mg


Prednisone (Prednisone Tab)  5 mg PO DAILY [facility]


   Last Admin: 04/08/18 08:37 Dose:  5 mg


Pregabalin (Lyrica)  100 mg PO HS [facility]


   Last Admin: 04/07/18 21:13 Dose:  100 mg


Promethazine HCl/Codeine (Phenergan/Codeine Oral Syrup)  10 ml PO Q4 PRN


   PRN Reason: Cough


   Last Admin: 04/07/18 21:04 Dose:  10 ml


Tramadol HCl (Ultram)  50 mg PO DAILY PRN


   PRN Reason: Pain, severe (8-10)


   Last Admin: 04/08/18 07:16 Dose:  50 mg


Trimethoprim/Sulfamethoxazole (Bactrim Ds Tab)  1 tab PO MWF [facility]


   PRN Reason: Protocol


   Last Admin: 04/06/18 08:16 Dose:  1 tab











- Labs


Labs: 


 





 04/05/18 06:20 





 04/06/18 06:05 





 











PT  17.6 Seconds (9.8-13.1)  H  04/08/18  06:20    


 


INR  1.6  (0.9-1.2)  H  04/08/18  06:20    














- Constitutional


Appears: No Acute Distress





- Head Exam


Head Exam: ATRAUMATIC, NORMAL INSPECTION, NORMOCEPHALIC





- Eye Exam


Eye Exam: EOMI, Normal appearance, PERRL


Pupil Exam: NORMAL ACCOMODATION, PERRL





- ENT Exam


ENT Exam: Mucous Membranes Moist, Normal Exam





- Neck Exam


Neck Exam: Full ROM, Normal Inspection.  absent: Lymphadenopathy





- Respiratory Exam


Respiratory Exam: Clear to Ausculation Bilateral, Prolonged Expiratory Phase, 

NORMAL BREATHING PATTERN





- Cardiovascular Exam


Cardiovascular Exam: REGULAR RHYTHM, +S1, +S2.  absent: Murmur





- GI/Abdominal Exam


GI & Abdominal Exam: Soft, Normal Bowel Sounds.  absent: Tenderness





- Rectal Exam


Rectal Exam: NORMAL INSPECTION





- Extremities Exam


Extremities Exam: Full ROM, Normal Capillary Refill, Normal Inspection, Pedal 

Edema.  absent: Joint Swelling


Additional comments: 





pedal edema less





- Back Exam


Back Exam: NORMAL INSPECTION





- Neurological Exam


Neurological Exam: Alert, Awake, CN II-XII Intact, Normal Gait, Oriented x3





- Psychiatric Exam


Psychiatric exam: Normal Affect, Normal Mood





- Skin


Skin Exam: Dry, Intact, Normal Color, Warm





Assessment and Plan





- Assessment and Plan (Free Text)


Assessment: 





asthma-improved


uri improved


pedal edema less


ashd-stable


s/p renal transplant


Plan: 





d/c in am if stable

## 2018-04-09 VITALS
OXYGEN SATURATION: 99 % | TEMPERATURE: 98.6 F | HEART RATE: 91 BPM | SYSTOLIC BLOOD PRESSURE: 103 MMHG | RESPIRATION RATE: 20 BRPM | DIASTOLIC BLOOD PRESSURE: 57 MMHG

## 2018-04-09 LAB
INR PPP: 1.6 (ref 0.9–1.2)
PROTHROMBIN TIME: 18.1 SECONDS (ref 9.8–13.1)

## 2018-04-09 RX ADMIN — VITAMIN D, TAB 1000IU (100/BT) SCH INTLU: 25 TAB at 08:56

## 2018-04-09 RX ADMIN — DILTIAZEM HYDROCHLORIDE SCH MG: 240 CAPSULE, COATED, EXTENDED RELEASE ORAL at 08:46

## 2018-04-09 RX ADMIN — IPRATROPIUM BROMIDE AND ALBUTEROL SULFATE SCH ML: .5; 3 SOLUTION RESPIRATORY (INHALATION) at 00:14

## 2018-04-09 RX ADMIN — INSULIN LISPRO SCH U: 100 INJECTION, SOLUTION INTRAVENOUS; SUBCUTANEOUS at 12:04

## 2018-04-09 RX ADMIN — IPRATROPIUM BROMIDE AND ALBUTEROL SULFATE SCH ML: .5; 3 SOLUTION RESPIRATORY (INHALATION) at 07:34

## 2018-04-09 RX ADMIN — INSULIN LISPRO SCH UNITS: 100 INJECTION, SUSPENSION SUBCUTANEOUS at 08:54

## 2018-04-09 RX ADMIN — INSULIN LISPRO SCH: 100 INJECTION, SOLUTION INTRAVENOUS; SUBCUTANEOUS at 06:48

## 2018-04-09 RX ADMIN — INSULIN LISPRO SCH UNITS: 100 INJECTION, SUSPENSION SUBCUTANEOUS at 16:24

## 2018-04-09 RX ADMIN — ACETYLCYSTEINE SCH ML: 100 SOLUTION ORAL; RESPIRATORY (INHALATION) at 07:34

## 2018-04-09 RX ADMIN — PANTOPRAZOLE SODIUM SCH MG: 40 TABLET, DELAYED RELEASE ORAL at 12:10

## 2018-04-09 RX ADMIN — IPRATROPIUM BROMIDE AND ALBUTEROL SULFATE SCH ML: .5; 3 SOLUTION RESPIRATORY (INHALATION) at 15:25

## 2018-04-09 RX ADMIN — PROMETHAZINE HYDROCHLORIDE AND CODEINE PHOSPHATE PRN ML: 6.25; 1 SOLUTION ORAL at 13:11

## 2018-04-09 RX ADMIN — HYDROCORTISONE SCH APPLIC: 25 CREAM TOPICAL at 09:00

## 2018-04-09 RX ADMIN — IPRATROPIUM BROMIDE AND ALBUTEROL SULFATE SCH ML: .5; 3 SOLUTION RESPIRATORY (INHALATION) at 11:09

## 2018-04-09 RX ADMIN — INSULIN LISPRO SCH: 100 INJECTION, SOLUTION INTRAVENOUS; SUBCUTANEOUS at 16:23

## 2018-04-09 RX ADMIN — IPRATROPIUM BROMIDE AND ALBUTEROL SULFATE SCH ML: .5; 3 SOLUTION RESPIRATORY (INHALATION) at 04:52

## 2018-04-09 RX ADMIN — HYDROCORTISONE SCH APPLIC: 25 CREAM TOPICAL at 16:27

## 2018-04-09 RX ADMIN — Medication SCH TAB: at 08:55

## 2018-04-09 NOTE — CP.PCM.PN
Subjective





- Date & Time of Evaluation


Date of Evaluation: 04/09/18


Time of Evaluation: 07:30





- Subjective


Subjective: 





NO COMPLAINTS





BREATHING BETTER





NO CHEST PAIN





Objective





- Vital Signs/Intake and Output


Vital Signs (last 24 hours): 


 











Temp Pulse Resp BP Pulse Ox


 


 97.7 F   80   20   127/59 L  98 


 


 04/09/18 08:01  04/09/18 08:51  04/09/18 08:01  04/09/18 08:51  04/09/18 08:01











- Medications


Medications: 


 Current Medications





Acetaminophen (Tylenol 325mg Tab)  650 mg PO Q6 PRN


   PRN Reason:  temp 101


Acetylcysteine (Mucomyst 10% 4ml)  4 ml IH RBID [facility]


   Last Admin: 04/09/18 07:34 Dose:  4 ml


Albuterol/Ipratropium (Duoneb 3 Mg/0.5 Mg (3 Ml) Ud)  3 ml INH RQ4 [facility]


   Last Admin: 04/09/18 07:34 Dose:  3 ml


Alprazolam (Xanax)  0.5 mg PO HS PRN


   PRN Reason: Anxiety


Aspirin (Ecotrin)  81 mg PO DAILY [facility]


   Last Admin: 04/09/18 08:47 Dose:  81 mg


Atorvastatin Calcium (Lipitor)  20 mg PO HS [facility]


   Last Admin: 04/08/18 21:49 Dose:  20 mg


Cholecalciferol (Vitamin D)  2,000 intlu PO DAILY [facility]


   Last Admin: 04/09/18 08:56 Dose:  2,000 intlu


Diltiazem HCl (Cardizem Cd)  240 mg PO DAILY [facility]


   Last Admin: 04/09/18 08:46 Dose:  240 mg


Furosemide (Lasix)  40 mg PO DAILY [facility]


   Last Admin: 04/09/18 08:50 Dose:  40 mg


Home Med (Astagraf Xl)  2 mg PO DAILY@0700 [facility]


   Last Admin: 04/09/18 06:49 Dose:  2 mg


Home Med (Mycophenolate Sodium [Mycophenolic Acid])  540 mg PO Q12 [facility]


   Last Admin: 04/09/18 08:52 Dose:  540 mg


Hydrocortisone (Anusol-Hc)  1 applic SD BID [facility]


   Last Admin: 04/09/18 09:00 Dose:  1 applic


Insulin Human Lispro (Humalog)  0 units SC ACHS [facility]


   PRN Reason: Protocol


   Last Admin: 04/09/18 06:48 Dose:  Not Given


Insulin Lispro Protam/Lispro Human (Humalog Mix 75/25)  20 units SC DIN [facility]


   Last Admin: 04/08/18 17:34 Dose:  20 units


Insulin Lispro Protam/Lispro Human (Humalog Mix 75/25)  30 units SC BRK [facility]


   Last Admin: 04/09/18 08:54 Dose:  30 units


Loperamide HCl (Imodium)  2 mg PO BID PRN


   PRN Reason: Diarrhea


Metoprolol Tartrate (Lopressor)  100 mg PO Q12 [facility]


   Last Admin: 04/09/18 08:51 Dose:  100 mg


Multivitamins/Minerals (Therapeutic-M Tab)  1 tab PO DAILY [facility]


   Last Admin: 04/09/18 08:55 Dose:  1 tab


Pantoprazole Sodium (Protonix Ec Tab)  40 mg PO ACL [facility]


   Last Admin: 04/08/18 11:30 Dose:  40 mg


Prednisone (Prednisone Tab)  5 mg PO DAILY [facility]


   Last Admin: 04/09/18 08:55 Dose:  5 mg


Promethazine HCl/Codeine (Phenergan/Codeine Oral Syrup)  10 ml PO Q4 PRN


   PRN Reason: Cough


   Last Admin: 04/08/18 21:55 Dose:  10 ml


Tramadol HCl (Ultram)  50 mg PO DAILY PRN


   PRN Reason: Pain, severe (8-10)


   Last Admin: 04/08/18 07:16 Dose:  50 mg











- Labs


Labs: 


 





 04/05/18 06:20 





 04/06/18 06:05 





 











PT  18.1 Seconds (9.8-13.1)  H  04/09/18  05:30    


 


INR  1.6  (0.9-1.2)  H  04/09/18  05:30    














- Respiratory Exam


Respiratory Exam: Clear to Ausculation Bilateral





- Cardiovascular Exam


Cardiovascular Exam: REGULAR RHYTHM, +S1, +S2





- Extremities Exam


Additional comments: 





MILD BILAT LE EDEMA





Assessment and Plan





- Assessment and Plan (Free Text)


Assessment: 





ASTHMA-IMPROVED





CAD-STABLE





HYPERTENSION





HYPERLIPIDEMIA


Plan: 





FOR DISCHARGE TODAY





CONTINUE ASPIRIN, DILTIAZEM, FUROSEMIDE, METOPROLOL AND ATORVASTATIN





FU CARDIOLOGY CARE WITH HIS PRIVATE CARDIOLOGIST, DR DYER

## 2018-04-09 NOTE — CP.PCM.DIS
Provider





- Provider


Date of Admission: 


04/02/18 15:42





Attending physician: 


Bismark Evans MD





Time Spent in preparation of Discharge (in minutes): 30





Diagnosis





- Discharge Diagnosis


(1) Asthma


Status: Acute   





(2) CAD (coronary artery disease)


Status: Acute   





(3) Cough


Status: Acute   





(4) Dyspnea and respiratory abnormalities


Status: Acute   





(5) Hx of CABG


Status: Acute   





(6) Hypertension


Status: Acute   





(7) Renal transplant recipient


Status: Acute   





(8) Upper respiratory infection


Status: Acute   





Hospital Course





- Lab Results


Lab Results: 


 Most Recent Lab Values











WBC  10.0 K/uL (4.8-10.8)   04/05/18  06:20    


 


RBC  5.24 Mil/uL (4.40-5.90)   04/05/18  06:20    


 


Hgb  14.8 g/dL (12.0-18.0)   04/05/18  06:20    


 


Hct  45.5 % (35.0-51.0)   04/05/18  06:20    


 


MCV  86.7 fl (80.0-94.0)   04/05/18  06:20    


 


MCH  28.2 pg (27.0-31.0)   04/05/18  06:20    


 


MCHC  32.6 g/dL (33.0-37.0)  L  04/05/18  06:20    


 


RDW  15.6 % (11.5-14.5)  H  04/05/18  06:20    


 


Plt Count  186 K/uL (130-400)   04/05/18  06:20    


 


PT  18.1 Seconds (9.8-13.1)  H  04/09/18  05:30    


 


INR  1.6  (0.9-1.2)  H  04/09/18  05:30    


 


Sodium  134 mmol/l (132-148)   04/06/18  06:05    


 


Potassium  4.8 MMOL/L (3.6-5.0)   04/06/18  06:05    


 


Chloride  97 mmol/L ()  L  04/06/18  06:05    


 


Carbon Dioxide  30 mmol/L (22-30)   04/06/18  06:05    


 


Anion Gap  12  (10-20)   04/06/18  06:05    


 


BUN  43 mg/dl (9-20)  H  04/06/18  06:05    


 


Creatinine  1.1 mg/dl (0.8-1.5)   04/06/18  06:05    


 


Est GFR ( Amer)  > 60   04/06/18  06:05    


 


Est GFR (Non-Af Amer)  > 60   04/06/18  06:05    


 


POC Glucose (mg/dL)  166 mg/dL ()  H  04/08/18  20:55    


 


Random Glucose  208 mg/dL ()  H  04/06/18  06:05    


 


Calcium  9.9 mg/dL (8.4-10.2)   04/06/18  06:05    


 


Tacrolimus (LC/MS/MS)  8.2 mcg/L (5.0-20.0)   04/04/18  06:00    














- Hospital Course


Hospital Course: 





sob and cough improved


pedal edema less





Discharge Exam





- Head Exam


Head Exam: ATRAUMATIC, NORMAL INSPECTION, NORMOCEPHALIC





- Eye Exam


Eye Exam: EOMI, Normal appearance, PERRL


Pupil Exam: NORMAL ACCOMODATION, PERRL





- Respiratory Exam


Respiratory Exam: Prolonged Expiratory Phase





- GI/Abdominal Exam


GI & Abdominal Exam: Normal Bowel Sounds





- Rectal Exam


Rectal Exam: NORMAL INSPECTION





- Extremities Exam


Extremities exam: pedal edema


Additional comments: 





edema less





- Neurological Exam


Neurological exam: Alert, CN II-XII Intact, Normal Gait, Oriented x3, Reflexes 

Normal





- Psychiatric Exam


Psychiatric exam: Normal Affect, Normal Mood





- Skin


Skin Exam: Dry, Intact, Normal Color, Warm





Discharge Plan





- Follow Up Plan


Condition: GOOD


Disposition: HOME/ ROUTINE


Patient education suggested?: Yes


Additional Instructions: 


discharge today


follow up with dr evans in 1 week

## 2020-08-05 ENCOUNTER — COMPLETE EYE EXAM (OUTPATIENT)
Dept: URBAN - METROPOLITAN AREA CLINIC 110 | Facility: CLINIC | Age: 55
End: 2020-08-05

## 2020-08-05 PROBLEM — Z96.1 YAG LASER CAPSULOTOMY OF LENS: Noted: 2017-05-03

## 2020-08-05 PROBLEM — E11.9 DIABETES, TYPE II, NO OCULAR COMPLICATIONS: Noted: 2020-08-05

## 2020-08-05 PROBLEM — Z79.4 DIABETES, TYPE II, NO OCULAR COMPLICATIONS: Noted: 2020-08-05

## 2022-02-16 ENCOUNTER — COMPLETE EYE EXAM (OUTPATIENT)
Dept: URBAN - METROPOLITAN AREA CLINIC 110 | Facility: CLINIC | Age: 57
End: 2022-02-16

## 2022-02-16 DIAGNOSIS — H43.391: ICD-10-CM

## 2022-02-16 DIAGNOSIS — E11.9: ICD-10-CM

## 2022-02-16 DIAGNOSIS — H44.522: ICD-10-CM

## 2022-02-16 DIAGNOSIS — H25.13: ICD-10-CM

## 2022-02-16 PROCEDURE — 76512 OPH US DX B-SCAN: CPT

## 2022-02-16 PROCEDURE — 92014 COMPRE OPH EXAM EST PT 1/>: CPT

## 2022-02-16 PROCEDURE — 92201 OPSCPY EXTND RTA DRAW UNI/BI: CPT

## 2022-02-16 ASSESSMENT — VISUAL ACUITY
OD_CC: 20/30-3
OD_CC: J1+

## 2022-02-16 ASSESSMENT — TONOMETRY: OD_IOP_MMHG: 16

## 2023-05-02 ENCOUNTER — ESTABLISHED COMPREHENSIVE EXAM (OUTPATIENT)
Dept: URBAN - METROPOLITAN AREA CLINIC 21 | Facility: CLINIC | Age: 58
End: 2023-05-02

## 2023-05-02 DIAGNOSIS — E11.9: ICD-10-CM

## 2023-05-02 DIAGNOSIS — H52.4: ICD-10-CM

## 2023-05-02 DIAGNOSIS — H52.01: ICD-10-CM

## 2023-05-02 DIAGNOSIS — H52.201: ICD-10-CM

## 2023-05-02 DIAGNOSIS — Z79.4: ICD-10-CM

## 2023-05-02 DIAGNOSIS — H43.391: ICD-10-CM

## 2023-05-02 DIAGNOSIS — H44.522: ICD-10-CM

## 2023-05-02 PROCEDURE — 92014 COMPRE OPH EXAM EST PT 1/>: CPT

## 2023-05-02 PROCEDURE — 92134 CPTRZ OPH DX IMG PST SGM RTA: CPT | Mod: NC

## 2023-05-02 PROCEDURE — 92015 DETERMINE REFRACTIVE STATE: CPT

## 2023-05-02 ASSESSMENT — VISUAL ACUITY
OD_CC: J1
OD_CC: 20/40+2

## 2023-05-02 ASSESSMENT — TONOMETRY: OD_IOP_MMHG: 14

## 2023-10-20 ENCOUNTER — EMERGENCY VISIT (OUTPATIENT)
Dept: URBAN - METROPOLITAN AREA CLINIC 110 | Facility: CLINIC | Age: 58
End: 2023-10-20

## 2023-10-20 DIAGNOSIS — H18.421: ICD-10-CM

## 2023-10-20 PROCEDURE — 92012 INTRM OPH EXAM EST PATIENT: CPT

## 2023-10-20 ASSESSMENT — TONOMETRY: OD_IOP_MMHG: 19

## 2023-10-20 ASSESSMENT — VISUAL ACUITY
OD_CC: 20/30-1
OD_CC: J2